# Patient Record
Sex: MALE | Race: WHITE | Employment: OTHER | URBAN - METROPOLITAN AREA
[De-identification: names, ages, dates, MRNs, and addresses within clinical notes are randomized per-mention and may not be internally consistent; named-entity substitution may affect disease eponyms.]

---

## 2021-04-11 ENCOUNTER — HOSPITAL ENCOUNTER (INPATIENT)
Age: 51
LOS: 5 days | Discharge: HOME | DRG: 330 | End: 2021-04-17
Attending: EMERGENCY MEDICINE | Admitting: FAMILY MEDICINE
Payer: OTHER GOVERNMENT

## 2021-04-11 DIAGNOSIS — R10.9 INTRACTABLE ABDOMINAL PAIN: Primary | ICD-10-CM

## 2021-04-11 DIAGNOSIS — R11.10 VOMITING, INTRACTABILITY OF VOMITING NOT SPECIFIED, PRESENCE OF NAUSEA NOT SPECIFIED, UNSPECIFIED VOMITING TYPE: ICD-10-CM

## 2021-04-11 DIAGNOSIS — Z90.49 STATUS POST COLECTOMY: ICD-10-CM

## 2021-04-11 DIAGNOSIS — K63.89 COLONIC MASS: ICD-10-CM

## 2021-04-11 LAB
ALBUMIN SERPL-MCNC: 3.9 G/DL (ref 3.4–5)
ALBUMIN/GLOB SERPL: 1.2 {RATIO} (ref 0.8–1.7)
ALP SERPL-CCNC: 55 U/L (ref 45–117)
ALT SERPL-CCNC: 62 U/L (ref 16–61)
ANION GAP SERPL CALC-SCNC: 7 MMOL/L (ref 3–18)
APPEARANCE UR: ABNORMAL
AST SERPL-CCNC: 41 U/L (ref 10–38)
BASOPHILS # BLD: 0.1 K/UL (ref 0–0.1)
BASOPHILS NFR BLD: 1 % (ref 0–2)
BILIRUB SERPL-MCNC: 0.3 MG/DL (ref 0.2–1)
BILIRUB UR QL: NEGATIVE
BUN SERPL-MCNC: 6 MG/DL (ref 7–18)
BUN/CREAT SERPL: 8 (ref 12–20)
CALCIUM SERPL-MCNC: 8.7 MG/DL (ref 8.5–10.1)
CHLORIDE SERPL-SCNC: 105 MMOL/L (ref 100–111)
CO2 SERPL-SCNC: 27 MMOL/L (ref 21–32)
COLOR UR: YELLOW
CREAT SERPL-MCNC: 0.76 MG/DL (ref 0.6–1.3)
DIFFERENTIAL METHOD BLD: ABNORMAL
EOSINOPHIL # BLD: 0.1 K/UL (ref 0–0.4)
EOSINOPHIL NFR BLD: 1 % (ref 0–5)
ERYTHROCYTE [DISTWIDTH] IN BLOOD BY AUTOMATED COUNT: 11.9 % (ref 11.6–14.5)
GLOBULIN SER CALC-MCNC: 3.2 G/DL (ref 2–4)
GLUCOSE SERPL-MCNC: 116 MG/DL (ref 74–99)
GLUCOSE UR STRIP.AUTO-MCNC: NEGATIVE MG/DL
HCT VFR BLD AUTO: 42.5 % (ref 36–48)
HGB BLD-MCNC: 14.3 G/DL (ref 13–16)
HGB UR QL STRIP: NEGATIVE
KETONES UR QL STRIP.AUTO: ABNORMAL MG/DL
LEUKOCYTE ESTERASE UR QL STRIP.AUTO: NEGATIVE
LIPASE SERPL-CCNC: 119 U/L (ref 73–393)
LYMPHOCYTES # BLD: 1.4 K/UL (ref 0.9–3.6)
LYMPHOCYTES NFR BLD: 28 % (ref 21–52)
MCH RBC QN AUTO: 30.6 PG (ref 24–34)
MCHC RBC AUTO-ENTMCNC: 33.6 G/DL (ref 31–37)
MCV RBC AUTO: 91 FL (ref 74–97)
MONOCYTES # BLD: 0.4 K/UL (ref 0.05–1.2)
MONOCYTES NFR BLD: 8 % (ref 3–10)
NEUTS SEG # BLD: 3 K/UL (ref 1.8–8)
NEUTS SEG NFR BLD: 61 % (ref 40–73)
NITRITE UR QL STRIP.AUTO: NEGATIVE
PH UR STRIP: 8 [PH] (ref 5–8)
PLATELET # BLD AUTO: 227 K/UL (ref 135–420)
PMV BLD AUTO: 8.3 FL (ref 9.2–11.8)
POTASSIUM SERPL-SCNC: 3.4 MMOL/L (ref 3.5–5.5)
PROT SERPL-MCNC: 7.1 G/DL (ref 6.4–8.2)
PROT UR STRIP-MCNC: NEGATIVE MG/DL
RBC # BLD AUTO: 4.67 M/UL (ref 4.35–5.65)
SODIUM SERPL-SCNC: 139 MMOL/L (ref 136–145)
SP GR UR REFRACTOMETRY: 1.01 (ref 1–1.03)
UROBILINOGEN UR QL STRIP.AUTO: 0.2 EU/DL (ref 0.2–1)
WBC # BLD AUTO: 4.9 K/UL (ref 4.6–13.2)

## 2021-04-11 PROCEDURE — 81003 URINALYSIS AUTO W/O SCOPE: CPT

## 2021-04-11 PROCEDURE — 80053 COMPREHEN METABOLIC PANEL: CPT

## 2021-04-11 PROCEDURE — 83690 ASSAY OF LIPASE: CPT

## 2021-04-11 PROCEDURE — 85025 COMPLETE CBC W/AUTO DIFF WBC: CPT

## 2021-04-11 PROCEDURE — 99285 EMERGENCY DEPT VISIT HI MDM: CPT

## 2021-04-11 PROCEDURE — 96374 THER/PROPH/DIAG INJ IV PUSH: CPT

## 2021-04-11 PROCEDURE — 74011250636 HC RX REV CODE- 250/636: Performed by: PHYSICIAN ASSISTANT

## 2021-04-11 PROCEDURE — 96375 TX/PRO/DX INJ NEW DRUG ADDON: CPT

## 2021-04-11 RX ORDER — ONDANSETRON 2 MG/ML
4 INJECTION INTRAMUSCULAR; INTRAVENOUS
Status: COMPLETED | OUTPATIENT
Start: 2021-04-11 | End: 2021-04-11

## 2021-04-11 RX ORDER — ONDANSETRON 2 MG/ML
4 INJECTION INTRAMUSCULAR; INTRAVENOUS
Status: DISCONTINUED | OUTPATIENT
Start: 2021-04-11 | End: 2021-04-11

## 2021-04-11 RX ORDER — MORPHINE SULFATE 4 MG/ML
4 INJECTION INTRAVENOUS
Status: COMPLETED | OUTPATIENT
Start: 2021-04-11 | End: 2021-04-11

## 2021-04-11 RX ADMIN — SODIUM CHLORIDE 1000 ML: 900 INJECTION, SOLUTION INTRAVENOUS at 23:33

## 2021-04-11 RX ADMIN — MORPHINE SULFATE 4 MG: 4 INJECTION INTRAVENOUS at 23:33

## 2021-04-11 RX ADMIN — ONDANSETRON 4 MG: 2 INJECTION INTRAMUSCULAR; INTRAVENOUS at 23:14

## 2021-04-12 ENCOUNTER — APPOINTMENT (OUTPATIENT)
Dept: CT IMAGING | Age: 51
DRG: 330 | End: 2021-04-12
Attending: PHYSICIAN ASSISTANT
Payer: OTHER GOVERNMENT

## 2021-04-12 PROBLEM — Z78.9 ALCOHOL USE: Status: ACTIVE | Noted: 2021-04-12

## 2021-04-12 PROBLEM — Z72.0 TOBACCO USE: Status: ACTIVE | Noted: 2021-04-12

## 2021-04-12 PROBLEM — R10.9 RIGHT SIDED ABDOMINAL PAIN: Status: ACTIVE | Noted: 2021-04-12

## 2021-04-12 PROBLEM — K63.89 COLONIC MASS: Status: ACTIVE | Noted: 2021-04-12

## 2021-04-12 PROBLEM — R10.9 INTRACTABLE ABDOMINAL PAIN: Status: ACTIVE | Noted: 2021-04-12

## 2021-04-12 PROCEDURE — 88305 TISSUE EXAM BY PATHOLOGIST: CPT

## 2021-04-12 PROCEDURE — 96376 TX/PRO/DX INJ SAME DRUG ADON: CPT

## 2021-04-12 PROCEDURE — 74011250636 HC RX REV CODE- 250/636: Performed by: FAMILY MEDICINE

## 2021-04-12 PROCEDURE — 2709999900 HC NON-CHARGEABLE SUPPLY: Performed by: INTERNAL MEDICINE

## 2021-04-12 PROCEDURE — 74011250637 HC RX REV CODE- 250/637: Performed by: FAMILY MEDICINE

## 2021-04-12 PROCEDURE — 74011250637 HC RX REV CODE- 250/637: Performed by: HOSPITALIST

## 2021-04-12 PROCEDURE — 0DBH8ZX EXCISION OF CECUM, VIA NATURAL OR ARTIFICIAL OPENING ENDOSCOPIC, DIAGNOSTIC: ICD-10-PCS | Performed by: INTERNAL MEDICINE

## 2021-04-12 PROCEDURE — 74177 CT ABD & PELVIS W/CONTRAST: CPT

## 2021-04-12 PROCEDURE — 77030040361 HC SLV COMPR DVT MDII -B: Performed by: INTERNAL MEDICINE

## 2021-04-12 PROCEDURE — 99153 MOD SED SAME PHYS/QHP EA: CPT | Performed by: INTERNAL MEDICINE

## 2021-04-12 PROCEDURE — 65270000029 HC RM PRIVATE

## 2021-04-12 PROCEDURE — G0500 MOD SEDAT ENDO SERVICE >5YRS: HCPCS | Performed by: INTERNAL MEDICINE

## 2021-04-12 PROCEDURE — 74011250636 HC RX REV CODE- 250/636: Performed by: INTERNAL MEDICINE

## 2021-04-12 PROCEDURE — 74011000250 HC RX REV CODE- 250: Performed by: FAMILY MEDICINE

## 2021-04-12 PROCEDURE — 77030039961 HC KT CUST COLON BSC -D: Performed by: INTERNAL MEDICINE

## 2021-04-12 PROCEDURE — 74011250636 HC RX REV CODE- 250/636: Performed by: EMERGENCY MEDICINE

## 2021-04-12 PROCEDURE — 76040000008: Performed by: INTERNAL MEDICINE

## 2021-04-12 PROCEDURE — 74011000636 HC RX REV CODE- 636: Performed by: EMERGENCY MEDICINE

## 2021-04-12 PROCEDURE — 74011000258 HC RX REV CODE- 258: Performed by: INTERNAL MEDICINE

## 2021-04-12 RX ORDER — MORPHINE SULFATE 4 MG/ML
4 INJECTION INTRAVENOUS
Status: DISCONTINUED | OUTPATIENT
Start: 2021-04-12 | End: 2021-04-16

## 2021-04-12 RX ORDER — FAMOTIDINE 20 MG/1
20 TABLET, FILM COATED ORAL DAILY
COMMUNITY

## 2021-04-12 RX ORDER — ACETAMINOPHEN 650 MG/1
650 SUPPOSITORY RECTAL
Status: DISCONTINUED | OUTPATIENT
Start: 2021-04-12 | End: 2021-04-16

## 2021-04-12 RX ORDER — ENOXAPARIN SODIUM 100 MG/ML
40 INJECTION SUBCUTANEOUS DAILY
Status: DISCONTINUED | OUTPATIENT
Start: 2021-04-12 | End: 2021-04-16

## 2021-04-12 RX ORDER — ACETAMINOPHEN 325 MG/1
650 TABLET ORAL
Status: DISCONTINUED | OUTPATIENT
Start: 2021-04-12 | End: 2021-04-16

## 2021-04-12 RX ORDER — POTASSIUM CHLORIDE 20 MEQ/1
40 TABLET, EXTENDED RELEASE ORAL
Status: COMPLETED | OUTPATIENT
Start: 2021-04-12 | End: 2021-04-12

## 2021-04-12 RX ORDER — POLYETHYLENE GLYCOL 3350 17 G/17G
17 POWDER, FOR SOLUTION ORAL DAILY PRN
Status: DISCONTINUED | OUTPATIENT
Start: 2021-04-12 | End: 2021-04-16

## 2021-04-12 RX ORDER — MIDAZOLAM HYDROCHLORIDE 1 MG/ML
INJECTION, SOLUTION INTRAMUSCULAR; INTRAVENOUS AS NEEDED
Status: DISCONTINUED | OUTPATIENT
Start: 2021-04-12 | End: 2021-04-13 | Stop reason: HOSPADM

## 2021-04-12 RX ORDER — PROMETHAZINE HYDROCHLORIDE 25 MG/1
12.5 TABLET ORAL
Status: DISCONTINUED | OUTPATIENT
Start: 2021-04-12 | End: 2021-04-17 | Stop reason: HOSPADM

## 2021-04-12 RX ORDER — POLYETHYLENE GLYCOL 3350, SODIUM SULFATE, SODIUM CHLORIDE, POTASSIUM CHLORIDE, SODIUM ASCORBATE, AND ASCORBIC ACID 7.5-2.691G
2000 KIT ORAL ONCE
Status: COMPLETED | OUTPATIENT
Start: 2021-04-12 | End: 2021-04-12

## 2021-04-12 RX ORDER — FENTANYL CITRATE 50 UG/ML
INJECTION, SOLUTION INTRAMUSCULAR; INTRAVENOUS AS NEEDED
Status: DISCONTINUED | OUTPATIENT
Start: 2021-04-12 | End: 2021-04-13 | Stop reason: HOSPADM

## 2021-04-12 RX ORDER — SODIUM CHLORIDE 9 MG/ML
INJECTION, SOLUTION INTRAVENOUS
Status: DISCONTINUED | OUTPATIENT
Start: 2021-04-12 | End: 2021-04-15 | Stop reason: HOSPADM

## 2021-04-12 RX ORDER — SODIUM CHLORIDE 0.9 % (FLUSH) 0.9 %
5-40 SYRINGE (ML) INJECTION AS NEEDED
Status: DISCONTINUED | OUTPATIENT
Start: 2021-04-12 | End: 2021-04-17 | Stop reason: SDUPTHER

## 2021-04-12 RX ORDER — SODIUM CHLORIDE 0.9 % (FLUSH) 0.9 %
5-40 SYRINGE (ML) INJECTION EVERY 8 HOURS
Status: DISCONTINUED | OUTPATIENT
Start: 2021-04-12 | End: 2021-04-17 | Stop reason: SDUPTHER

## 2021-04-12 RX ORDER — ONDANSETRON 2 MG/ML
4 INJECTION INTRAMUSCULAR; INTRAVENOUS
Status: DISCONTINUED | OUTPATIENT
Start: 2021-04-12 | End: 2021-04-17 | Stop reason: HOSPADM

## 2021-04-12 RX ORDER — ONDANSETRON 2 MG/ML
4 INJECTION INTRAMUSCULAR; INTRAVENOUS
Status: COMPLETED | OUTPATIENT
Start: 2021-04-12 | End: 2021-04-12

## 2021-04-12 RX ORDER — MORPHINE SULFATE 4 MG/ML
4 INJECTION INTRAVENOUS
Status: COMPLETED | OUTPATIENT
Start: 2021-04-12 | End: 2021-04-12

## 2021-04-12 RX ADMIN — PEG-3350, SODIUM SULFATE, SODIUM CHLORIDE, POTASSIUM CHLORIDE, SODIUM ASCORBATE AND ASCORBIC ACID 2000 ML: KIT at 06:30

## 2021-04-12 RX ADMIN — ONDANSETRON 4 MG: 2 INJECTION INTRAMUSCULAR; INTRAVENOUS at 03:27

## 2021-04-12 RX ADMIN — POTASSIUM CHLORIDE 40 MEQ: 1500 TABLET, EXTENDED RELEASE ORAL at 12:51

## 2021-04-12 RX ADMIN — IOPAMIDOL 100 ML: 612 INJECTION, SOLUTION INTRAVENOUS at 01:36

## 2021-04-12 RX ADMIN — FAMOTIDINE 20 MG: 10 INJECTION INTRAVENOUS at 09:01

## 2021-04-12 RX ADMIN — FAMOTIDINE 20 MG: 10 INJECTION INTRAVENOUS at 22:22

## 2021-04-12 RX ADMIN — MORPHINE SULFATE 4 MG: 4 INJECTION INTRAVENOUS at 22:51

## 2021-04-12 RX ADMIN — MORPHINE SULFATE 4 MG: 4 INJECTION INTRAVENOUS at 03:27

## 2021-04-12 RX ADMIN — Medication 10 ML: at 06:34

## 2021-04-12 RX ADMIN — PROMETHAZINE HYDROCHLORIDE 12.5 MG: 25 TABLET ORAL at 15:09

## 2021-04-12 RX ADMIN — MORPHINE SULFATE 4 MG: 4 INJECTION INTRAVENOUS at 15:02

## 2021-04-12 RX ADMIN — MORPHINE SULFATE 4 MG: 4 INJECTION INTRAVENOUS at 06:30

## 2021-04-12 RX ADMIN — MORPHINE SULFATE 4 MG: 4 INJECTION INTRAVENOUS at 19:23

## 2021-04-12 RX ADMIN — Medication 10 ML: at 22:00

## 2021-04-12 RX ADMIN — ONDANSETRON 4 MG: 2 INJECTION INTRAMUSCULAR; INTRAVENOUS at 19:23

## 2021-04-12 RX ADMIN — MORPHINE SULFATE 4 MG: 4 INJECTION INTRAVENOUS at 11:09

## 2021-04-12 RX ADMIN — ONDANSETRON 4 MG: 2 INJECTION INTRAMUSCULAR; INTRAVENOUS at 12:51

## 2021-04-12 NOTE — ED PROVIDER NOTES
EMERGENCY DEPARTMENT HISTORY AND PHYSICAL EXAM    Date: 4/11/2021  Patient Name: Eulalia Chen    History of Presenting Illness     Chief Complaint   Patient presents with    Abdominal Pain         History Provided By: Patient    Chief Complaint: abd pain       Additional History (Context):   10:42 PM  Eulalia Chen is a 48 y.o. male with PMHX HTN presents to the emergency department C/O abdominal pain that started tonight states it comes and goes very couple minutes on the left. He had vomiting once he arrived in the ED. No diarrhea or constipation. No fevers. Denies any history of abdominal surgeries. PCP: None        Past History     Past Medical History:  Past Medical History:   Diagnosis Date    GERD (gastroesophageal reflux disease)     Hypertension        Past Surgical History:  Past Surgical History:   Procedure Laterality Date    COLONOSCOPY N/A 4/12/2021    COLONOSCOPY; BIOPSY performed by Junie Sadler MD at [facility] ENDOSCOPY    HX TONSILLECTOMY         Family History:  Family History   Problem Relation Age of Onset    Cancer Mother     Cancer Father     Cancer Maternal Uncle        Social History:  Social History     Tobacco Use    Smoking status: Never Smoker    Smokeless tobacco: Current User   Substance Use Topics    Alcohol use: Yes     Alcohol/week: 7.0 - 8.0 standard drinks     Types: 7 - 8 Cans of beer per week    Drug use: Never       Allergies:  No Known Allergies    Review of Systems   Review of Systems   Constitutional: Negative for chills and fever. Respiratory: Negative for shortness of breath. Cardiovascular: Negative for chest pain. Gastrointestinal: Positive for abdominal pain, nausea and vomiting. Negative for constipation and diarrhea. Genitourinary: Negative for decreased urine volume, difficulty urinating, dysuria, enuresis, flank pain, frequency, hematuria and urgency. Musculoskeletal: Negative for back pain.    Neurological: Negative for weakness and numbness. All other systems reviewed and are negative. Physical Exam     Vitals:    04/12/21 2228 04/13/21 0253 04/13/21 0754 04/13/21 1135   BP:  (!) 154/93 (!) 155/100 (!) 135/92   Pulse:  93 80 79   Resp:  16 16 16   Temp: 98.7 °F (37.1 °C) 99.3 °F (37.4 °C) 98.8 °F (37.1 °C) 98.2 °F (36.8 °C)   SpO2:  95% 96% 99%   Weight:       Height:         Physical Exam  Vitals signs and nursing note reviewed. Constitutional:       Appearance: He is well-developed. Comments: Alert, lying on stretcher, appears very uncomfortable   HENT:      Head: Normocephalic and atraumatic. Neck:      Musculoskeletal: Normal range of motion and neck supple. Cardiovascular:      Rate and Rhythm: Normal rate and regular rhythm. Heart sounds: Normal heart sounds. No murmur. Pulmonary:      Effort: Pulmonary effort is normal. No respiratory distress. Breath sounds: Normal breath sounds. No wheezing or rales. Abdominal:      General: Bowel sounds are normal.      Palpations: Abdomen is soft. Tenderness: There is abdominal tenderness in the right lower quadrant. There is guarding and rebound. There is no right CVA tenderness or left CVA tenderness. Neurological:      Mental Status: He is alert and oriented to person, place, and time.    Psychiatric:         Judgment: Judgment normal.         Diagnostic Study Results     Labs:     Recent Results (from the past 12 hour(s))   METABOLIC PANEL, COMPREHENSIVE    Collection Time: 04/13/21  3:58 AM   Result Value Ref Range    Sodium 138 136 - 145 mmol/L    Potassium 3.9 3.5 - 5.5 mmol/L    Chloride 101 100 - 111 mmol/L    CO2 29 21 - 32 mmol/L    Anion gap 8 3.0 - 18 mmol/L    Glucose 107 (H) 74 - 99 mg/dL    BUN 8 7.0 - 18 MG/DL    Creatinine 0.90 0.6 - 1.3 MG/DL    BUN/Creatinine ratio 9 (L) 12 - 20      GFR est AA >60 >60 ml/min/1.73m2    GFR est non-AA >60 >60 ml/min/1.73m2    Calcium 8.6 8.5 - 10.1 MG/DL    Bilirubin, total 0.6 0.2 - 1.0 MG/DL    ALT (SGPT) 45 16 - 61 U/L    AST (SGOT) 26 10 - 38 U/L    Alk. phosphatase 52 45 - 117 U/L    Protein, total 6.6 6.4 - 8.2 g/dL    Albumin 3.6 3.4 - 5.0 g/dL    Globulin 3.0 2.0 - 4.0 g/dL    A-G Ratio 1.2 0.8 - 1.7     HEPATITIS PANEL, ACUTE    Collection Time: 04/13/21  3:58 AM   Result Value Ref Range    Hepatitis A, IgM Negative NEG      __          Hepatitis B surface Ag <0.10 <1.00 Index    Hep B surface Ag Interp. Negative NEG      __          Hepatitis B core, IgM Negative NEG      __          Hepatitis C virus Ab 0.05 <0.80 Index    Hep C virus Ab Interp. Negative NEG      Hep C  virus Ab comment         IRON PROFILE    Collection Time: 04/13/21  3:58 AM   Result Value Ref Range    Iron 160 50 - 175 ug/dL    TIBC 435 250 - 450 ug/dL    Iron % saturation 37 20 - 50 %   CBC W/O DIFF    Collection Time: 04/13/21  3:58 AM   Result Value Ref Range    WBC 13.2 4.6 - 13.2 K/uL    RBC 4.39 4.35 - 5.65 M/uL    HGB 13.4 13.0 - 16.0 g/dL    HCT 41.4 36.0 - 48.0 %    MCV 94.3 74.0 - 97.0 FL    MCH 30.5 24.0 - 34.0 PG    MCHC 32.4 31.0 - 37.0 g/dL    RDW 12.1 11.6 - 14.5 %    PLATELET 944 578 - 426 K/uL    MPV 8.6 (L) 9.2 - 11.8 FL       Radiologic Studies:   CT ABD PELV W CONT   Final Result      Lobular circumferential thickening of the ascending colon/cecum. Suspect a   neoplastic process. Fatty infiltration of the liver. CT Results  (Last 48 hours)               04/12/21 0144  CT ABD PELV W CONT Final result    Impression:      Lobular circumferential thickening of the ascending colon/cecum. Suspect a   neoplastic process. Fatty infiltration of the liver. Narrative:  EXAM: CT of the abdomen and pelvis       INDICATION: Pain. COMPARISON: None. TECHNIQUE: Axial CT imaging of the abdomen and pelvis was performed with   intravenous contrast. Multiplanar reformats were generated.  Dose reduction   techniques used: automated exposure control, adjustment of the mAs and/or kVp   according to patient size, and iterative reconstruction techniques. Digital   imaging and communications in Medicine (DICOM) format image data are available   to nonaffiliated external healthcare facilities or entities on a secure, media   free, reciprocally searchable basis with patient authorization for at least 12   months after this study. _______________       FINDINGS:       LOWER CHEST: Unremarkable. LIVER, BILIARY: The liver is of diminished attenuation. No biliary dilation. Gallbladder is unremarkable. PANCREAS: Normal.       SPLEEN: Normal.       ADRENALS: Normal.       KIDNEYS: There are a few subcentimeter hypodensities within the left kidney to   small to characterize but likely small cysts. LYMPH NODES: No enlarged lymph nodes. GASTROINTESTINAL TRACT: No bowel dilation or wall thickening. There is lobular   circumferential thickening of the cecum/ascending colon. PELVIC ORGANS: Unremarkable. VASCULATURE: Unremarkable. BONES: No acute or aggressive osseous abnormalities identified. OTHER: None.       _______________               CXR Results  (Last 48 hours)    None          Medical Decision Making   I am the first provider for this patient. I reviewed the vital signs, available nursing notes, past medical history, past surgical history, family history and social history. Vital Signs: Reviewed the patient's vital signs. Pulse Oximetry Analysis: 100% on RA     Records Reviewed: Nursing Notes and Old Medical Records    Procedures:  Procedures    ED Course:   10:42 PM Initial assessment performed. The patients presenting problems have been discussed, and they are in agreement with the care plan formulated and outlined with them. I have encouraged them to ask questions as they arise throughout their visit. SIGN OUT:  1:32 AM   Patient's presentation, labs/imaging and plan of care was reviewed with Debra Cintron MD as part of sign out.   They will follow up on ct as part of the plan discussed with the patient. Jose Soria. Evelyn Shrestha MD's assistance in completion of this plan is greatly appreciated but it should be noted that I will be the provider of record for this patient. FACE-TO-FACE PROGRESS NOTE:  3:15 AM  The patient presents with ongoing abdominal pain with nausea and vomiting. He has no fevers or weight loss or night sweats or even blood per rectum however pain has been difficult to control. He did respond to antiemetics. Otherwise he is without complaint. My exam shows well-appearing nontoxic male who is continued pain distress. He has continued tenderness to the left upper left lower quadrant. Otherwise his exam is unremarkable including cardiovascular system no evidence of hypertension denies without severe pallor. Imp/plan: CT did reveal the lesion was suspicious for colon cancer. His pain is difficult to control. For this reason we consulted both GI and hospitalist who are in agreement of keeping the patient for both pain control and colonoscopy for definitive analysis. I have personally seen and examined the patient, reviewed the PRETTY's note and agree with findings and plan. Written by Homar Jorgensen MD.    CONSULT NOTE:   3:45 AM  Homar Jorgensen MD   spoke with Dr. Azul Mcbride: Gastroenterology  Discussed pt's hx, disposition, and available diagnostic and imaging results  over the telephone. Reviewed care plans. Consulting physician agrees with plans as outlined. We will begin bowel prep immediately a lot of patient have clear liquids and scope for definitive diagnosis of cancer versus other findings in his differential.      CONSULT NOTE:   3:55 AM  Homar Jorgensen MD   spoke with Dr. Josselyn Finney  Specialty: Hospitalist  Discussed pt's hx, disposition, and available diagnostic and imaging results  over the telephone. Reviewed care plans. Consulting physician agrees with plans as outlined.   Willing to admit patient medical floor with GI to consult for definitive diagnosis. Diagnosis and Disposition     Critical Care Time: 0 minutes    Core Measures:  For Hospitalized Patients:    1. Hospitalization Decision Time:  The decision to hospitalize the patient was made by Wilber Avila MD   at 3:55 AM on April 12, 2021    2. Aspirin: Aspirin was not given because the patient is a bleeding risk    3:55 AM patient is being admitted to the hospital by Dr. Olivia Grider. The results of their tests and reasons for their admission have been discussed with them and/or available family. They convey agreement and understanding for the need to be admitted and for their admission diagnosis. CONDITIONS ON ADMISSION:  Sepsis is not present at the time of admission. Deep Vein Thrombosis is not present at the time of admission. Thrombosis is not present at the time of admission. Urinary Tract Infection is not present at the time of admission. Pneumonia is not present at the time of admission. MRSA is not present at the time of admission. Wound infection is not present at the time of admission. Pressure Ulcer is not present at the time of admission. CLINICAL IMPRESSION:    1. Intractable abdominal pain    2. Colonic mass    3. Vomiting, intractability of vomiting not specified, presence of nausea not specified, unspecified vomiting type                 Please note that this dictation was completed with [employer], the computer voice recognition software. Quite often unanticipated grammatical, syntax, homophones, and other interpretive errors are inadvertently transcribed by the computer software. Please disregard these errors. Please excuse any errors that have escaped final proofreading.

## 2021-04-12 NOTE — PROGRESS NOTES
TRANSFER - OUT REPORT:    Verbal report given to Neftali Balbuena on Kristina Erps  being transferred to [facility] for routine progression of care       Report consisted of patients Situation, Background, Assessment and   Recommendations(SBAR). Information from the following report(s) ED Summary, MAR and Recent Results was reviewed with the receiving nurse. Lines:   Peripheral IV 04/11/21 Left Antecubital (Active)        Opportunity for questions and clarification was provided.       Patient transported with:   [employer]

## 2021-04-12 NOTE — PERIOP NOTES
Telephone report given to Moses Paredes RN. Pt received 100 mcg fentanyl, 5 mg of versed during procedure. Biopsy of mass in cecum performed and sent to lab. Pt will open eyes and answer questions when spoken to by hospital staff. Pt is able to move all four extremities voluntarily on command.  Pt to be transported upstairs by endoscopy staff members

## 2021-04-12 NOTE — ED TRIAGE NOTES
Pt presents with c/o left sided abdominal pain for a couple hours. Pt denies N/V/D. Pt is A&Ox4, resps E&U, NAD.

## 2021-04-12 NOTE — PROGRESS NOTES
3180 Bedside and verbal shift change report given to Ankush Kovacs RN (on coming nurse) by Tasha Macias RN (off going nurse). Report included the following information SBAR, Kardex, OR Summary, Intake/Output and MAR.    0910 started second bottle of bowel prep. 1255 Pt c/o of n/v Pt reported vomiting and output was about 2000ml. PRN zofran administered. Pt c/o 8 10 pain to LLQ. PRN pain medication administered. 1920 Bedside and verbal shift change report given by Ankush Kovacs RN (off going nurse) to Shamar Laurent RN(on coming nurse). Report included the following information SBAR, Kardex, OR Summary, Intake/Output and MAR.

## 2021-04-12 NOTE — H&P
History & Physical    Patient: Rhina Pozo MRN: [id number]  CSN: [id number]    YOB: 1970  Age: 48 y.o. Sex: male      DOA: 4/11/2021  Primary Care Provider:  None      Assessment/Plan     Patient Active Problem List   Diagnosis Code    Right sided abdominal pain R10.9    Colonic mass K63.89    Tobacco use Z72.0    Alcohol use Z67.80     51-year-old male with chewing tobacco use is admitted for mass in the ascending colon with intractable abdominal pain. Right-sided abdominal pain due to ascending colon masssuspicious for malignancy  Clear liquid diet  Bowel prep  Gastroenterology consult  Colonoscopy with biopsy for tissue diagnosis    Tobacco useuses chewing tobacco  History obtained from tobacco use    Alcohol use7 beers per week  Can consider CIWA scale if needed    Full code    Prophylaxis: Pepcid IV, Lovenox SQ    Estimated length of stay : 2-3 nights    Corinne Gilbert, MD  Nocturnist  ----------------------------------------------------------------------------------------------------------------------------------------------------------------------------  CC: Abdominal pain       HPI:     Rhina Pozo is a 48 y.o. male who presents to the ED with his wife after acute onset of abdominal pain this evening. He has not had similar episodes prior to this. He does have significant for cancer in his family history with an uncle who has had colon cancer. He has never had a colonoscopy. He endorses a change in bowel habits with softer stool starting about 6 months ago. No weight loss, change in appetite, fever, or cough. History reviewed. No pertinent past medical history.     Past Surgical History:   Procedure Laterality Date    HX TONSILLECTOMY         Family History   Problem Relation Age of Onset    Cancer Mother     Cancer Father     Cancer Maternal Uncle        Social History     Socioeconomic History    Marital status:      Spouse name: Not on file    Number of children: Not on file    Years of education: Not on file    Highest education level: Not on file   Substance and Sexual Activity    Alcohol use: Yes     Alcohol/week: 7.0 - 8.0 standard drinks     Types: 7 - 8 Cans of beer per week    Drug use: Never   Other Topics Concern       Prior to Admission medications    Not on File       No Known Allergies    Review of Systems  Gen: No fever, chills, malaise, weight loss/gain. Heent: No headache, rhinorrhea, sore throat. Heart: No chest pain, palpitations, CAMACHO, pnd, or orthopnea. Resp: No cough, hemoptysis, wheezing and shortness of breath. GI: No nausea, +vomiting x1, loose stool, no constipation, melena or hematochezia. : No urinary obstruction, dysuria or hematuria. Derm: No rash, new skin lesion or pruritis. Musc/skeletal: no bone or joint complaints. Vasc: No edema, cyanosis or claudication. Endo: No heat/cold intolerance, no polyuria,polydipsia or polyphagia. Heme: No easy bruising or bleeding. Physical Exam:     Physical Exam:  Visit Vitals  BP (!) 174/97 (BP 1 Location: Left upper arm, BP Patient Position: At rest)   Pulse 88   Temp 98.1 °F (36.7 °C)   Resp 16   Ht 5' 10\" (1.778 m)   Wt 83.9 kg (185 lb)   SpO2 97%   BMI 26.54 kg/m²      O2 Device: None (Room air)    Temp (24hrs), Av.4 °F (36.9 °C), Min:98.1 °F (36.7 °C), Max:98.7 °F (37.1 °C)     1901 -  0700  In: 1000 [I.V.:1000]  Out: -    No intake/output data recorded. General:  Awake, cooperative, no distress. Head:  Normocephalic, without obvious abnormality, atraumatic. Eyes:  Conjunctivae/corneas clear, sclera anicteric. Neck: Supple, symmetrical, trachea midline. Lungs:   Clear to auscultation bilaterally. Heart:  Regular rate and rhythm, S1, S2 normal, no murmur, click, rub or gallop. Abdomen: Soft, tenderness to palpation in right lower quadrant, no rebound tenderness. Bowel sounds normal. No masses,  No organomegaly. Extremities: Extremities normal, atraumatic, no cyanosis or edema. Capillary refill normal.   Pulses: 2+ and symmetric all extremities. Skin: Skin color pink, turgor normal. No rashes or lesions   Neurologic: No focal motor or sensory deficit. Labs Reviewed: All lab results for the last 24 hours reviewed. Recent Results (from the past 24 hour(s))   CBC WITH AUTOMATED DIFF    Collection Time: 04/11/21 10:40 PM   Result Value Ref Range    WBC 4.9 4.6 - 13.2 K/uL    RBC 4.67 4.35 - 5.65 M/uL    HGB 14.3 13.0 - 16.0 g/dL    HCT 42.5 36.0 - 48.0 %    MCV 91.0 74.0 - 97.0 FL    MCH 30.6 24.0 - 34.0 PG    MCHC 33.6 31.0 - 37.0 g/dL    RDW 11.9 11.6 - 14.5 %    PLATELET 602 642 - 584 K/uL    MPV 8.3 (L) 9.2 - 11.8 FL    NEUTROPHILS 61 40 - 73 %    LYMPHOCYTES 28 21 - 52 %    MONOCYTES 8 3 - 10 %    EOSINOPHILS 1 0 - 5 %    BASOPHILS 1 0 - 2 %    ABS. NEUTROPHILS 3.0 1.8 - 8.0 K/UL    ABS. LYMPHOCYTES 1.4 0.9 - 3.6 K/UL    ABS. MONOCYTES 0.4 0.05 - 1.2 K/UL    ABS. EOSINOPHILS 0.1 0.0 - 0.4 K/UL    ABS. BASOPHILS 0.1 0.0 - 0.1 K/UL    DF AUTOMATED     METABOLIC PANEL, COMPREHENSIVE    Collection Time: 04/11/21 10:40 PM   Result Value Ref Range    Sodium 139 136 - 145 mmol/L    Potassium 3.4 (L) 3.5 - 5.5 mmol/L    Chloride 105 100 - 111 mmol/L    CO2 27 21 - 32 mmol/L    Anion gap 7 3.0 - 18 mmol/L    Glucose 116 (H) 74 - 99 mg/dL    BUN 6 (L) 7.0 - 18 MG/DL    Creatinine 0.76 0.6 - 1.3 MG/DL    BUN/Creatinine ratio 8 (L) 12 - 20      GFR est AA >60 >60 ml/min/1.73m2    GFR est non-AA >60 >60 ml/min/1.73m2    Calcium 8.7 8.5 - 10.1 MG/DL    Bilirubin, total 0.3 0.2 - 1.0 MG/DL    ALT (SGPT) 62 (H) 16 - 61 U/L    AST (SGOT) 41 (H) 10 - 38 U/L    Alk.  phosphatase 55 45 - 117 U/L    Protein, total 7.1 6.4 - 8.2 g/dL    Albumin 3.9 3.4 - 5.0 g/dL    Globulin 3.2 2.0 - 4.0 g/dL    A-G Ratio 1.2 0.8 - 1.7     LIPASE    Collection Time: 04/11/21 10:40 PM   Result Value Ref Range    Lipase 119 73 - 393 U/L   URINALYSIS W/ RFLX MICROSCOPIC    Collection Time: 04/11/21 11:39 PM   Result Value Ref Range    Color YELLOW      Appearance TURBID      Specific gravity 1.013 1.005 - 1.030      pH (UA) 8.0 5.0 - 8.0      Protein Negative NEG mg/dL    Glucose Negative NEG mg/dL    Ketone TRACE (A) NEG mg/dL    Bilirubin Negative NEG      Blood Negative NEG      Urobilinogen 0.2 0.2 - 1.0 EU/dL    Nitrites Negative NEG      Leukocyte Esterase Negative NEG       Results  CT ABD PELV W CONT (Accession [id number]) (Order [id number])  Allergies     No Known Allergies   Exam Information    Status Exam Begun  Exam Ended    Final [99] 4/12/2021 01:35 4/12/2021  1:44 AM [id number]  1:44 AM   Result Information    Status: Final result (Exam End: 4/12/2021 01:44) Provider Status: Open   Study Result    EXAM: CT of the abdomen and pelvis     INDICATION: Pain.     COMPARISON: None.     TECHNIQUE: Axial CT imaging of the abdomen and pelvis was performed with  intravenous contrast. Multiplanar reformats were generated. Dose reduction  techniques used: automated exposure control, adjustment of the mAs and/or kVp  according to patient size, and iterative reconstruction techniques. Digital  imaging and communications in Medicine (DICOM) format image data are available  to nonaffiliated external healthcare facilities or entities on a secure, media  free, reciprocally searchable basis with patient authorization for at least 12  months after this study. _______________     FINDINGS:     LOWER CHEST: Unremarkable.     LIVER, BILIARY: The liver is of diminished attenuation. No biliary dilation. Gallbladder is unremarkable.     PANCREAS: Normal.     SPLEEN: Normal.     ADRENALS: Normal.     KIDNEYS: There are a few subcentimeter hypodensities within the left kidney to  small to characterize but likely small cysts.     LYMPH NODES: No enlarged lymph nodes.     GASTROINTESTINAL TRACT: No bowel dilation or wall thickening.  There is lobular  circumferential thickening of the cecum/ascending colon.      PELVIC ORGANS: Unremarkable.     VASCULATURE: Unremarkable.     BONES: No acute or aggressive osseous abnormalities identified.     OTHER: None.     _______________     IMPRESSION     Lobular circumferential thickening of the ascending colon/cecum. Suspect a  neoplastic process.     Fatty infiltration of the liver.

## 2021-04-12 NOTE — ACP (ADVANCE CARE PLANNING)
assisted patient in completing Advance Medical Directives. A copy was placed in the chart for scanning and extra copies were left for the patient.  completed visit with patient and offered Pastoral care. Chaplains will continue to follow and will provide pastoral care  as needed or requested.      Sister Abdulaziz Alicia, [address] [facility] [address]  [phone]

## 2021-04-12 NOTE — PROCEDURES
[facility]  Colonoscopy Procedure Report  _______________________________________________________  Patient: Fernanda Fernandez                                        Attending Physician: Delia Linares MD    Patient ID: [id number]                                    Referring Physician: None    Exam Date: 4/12/2021      Introduction: A  48 y.o. male patient, presents for inpatient Colonoscopy    Indications: New onset acute LLQ pain started yesterday morning. CT scan of the abdomen and pelvis:    Lobular circumferential thickening of the ascending colon/cecum. Suspect a neoplastic process. Fatty infiltration of the liver. CBC normal. Usually, he has one bm daily. No rectal bleeding. No weight loss. One uncle had colon cancer. Consent: The benefits, risks, and alternatives to the procedure were discussed and informed consent was obtained from the patient. Preparation: EKG, pulse, pulse oximetry and blood pressure were monitored throughout the procedure. ASA Classification: Class I- . The heart is an S1-S2 and regular heart rate and rhythm. Lungs are clear to auscultation and percussion. Abdomen is soft, nondistended, and nontender. Mental Status: awake, alert, and oriented to person, place, and time    Medications:  · Fentanyl 100 mcg IV before procedure. · Versed 5 mg IV throughout the procedure. Rectal Exam: Normal Rectal Exam. No Blood. Pathology Specimens:  1    Procedure: The colonoscope was passed with ease through the anus under direct visualization and advanced to the cecum. The scope was withdrawn and the mucosa was carefully examined. The quality of the preparation was poor. The views were fair. The patient's toleration of the procedure was excellent. Total time is 14 minutes and withdrawal time is 8 minutes. Findings:    Rectum:   Normal  Sigmoid: At least one 9 mm sessile polyp in the mid sigmoid colon at 22 cm not removed because of the poor bowel prep.   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 Biopsies taken. Cecum:   Large cancerous tumor. Terminal Ileum:   Not entered      Unplanned Events: There were no unplanned events. Estimated Blood Loss: None  Impressions: Poor bowel prep. There is at least one 9 mm sessile polyp in the mid sigmoid colon at 22 cm not removed because of the poor bowel prep. 13 mm semipedunculated polyp in the hepatic flexure, not removed because it would be removed with the right hemicolectomy. 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 Biopsies taken. Large cecal cancerous tumor. Normal Mucosa. No blood or AVM found. Complications: None; patient tolerated the procedure well. Recommendations:  · Return to the floor. Consult with Dr Radha Taylor. [facility]  · Resume full liquid diet. · Resume own medications. Avoid all NSAID's for  · Colonoscopy recommendation in  No longer than one year with adequate bowel prep to perform an adequate exam and remove all the polyps. · Take Miralax and/ or Colace 100 mg on regular basis  · Recommend genetic testing for HNPCC Mutation.   All first degree relative should undergo screening colonoscopy starting at age 36 or any age if symptoms    Procedure Codes:    · COLONOSCOPY,BIOPSY [[id number]]    Endoscope Information:  Model Number(s)    [id number]   Assistant: None  Signed By: Anayeli Grossman MD Date: 4/12/2021

## 2021-04-12 NOTE — PROGRESS NOTES
TRANSFER - IN REPORT:    Verbal report received from [facility], RN(name) on [facility]  being received from ED(unit) for routine progression of care      Report consisted of patients Situation, Background, Assessment and   Recommendations(SBAR). Information from the following report(s) SBAR, Kardex, ED Summary, Intake/Output, MAR and Recent Results was reviewed with the receiving nurse. Opportunity for questions and clarification was provided. Assessment completed upon patients arrival to unit and care assumed. 0455 Pt received in room, A/O x4, ambulated in hallway, voided in bathroom. 0600 Checked with MD and pharmacist regarding administration of PEG 3350. Informed by pharmacist to give 2nd dose at least 1 and 1/2 hrs after 1st dose - will pass on in report to incoming nurse. Bowel prep started, no scheduled posted for colonoscopy at this time. 0715 Bedside and Verbal shift change report given to Annika Barksdale RN (oncoming nurse) by Jose Victoria RN   (offgoing nurse). Report included the following information SBAR, Kardex, Intake/Output, MAR and Recent Results.

## 2021-04-12 NOTE — PROGRESS NOTES
Problem: Falls - Risk of  Goal: *Absence of Falls  Description: Document Tomas Maguire Fall Risk and appropriate interventions in the flowsheet.   Outcome: Progressing Towards Goal  Note: Fall Risk Interventions:            Medication Interventions: Evaluate medications/consider consulting pharmacy, Patient to call before getting OOB

## 2021-04-13 LAB
ALBUMIN SERPL-MCNC: 3.6 G/DL (ref 3.4–5)
ALBUMIN/GLOB SERPL: 1.2 {RATIO} (ref 0.8–1.7)
ALP SERPL-CCNC: 52 U/L (ref 45–117)
ALT SERPL-CCNC: 45 U/L (ref 16–61)
ANION GAP SERPL CALC-SCNC: 8 MMOL/L (ref 3–18)
AST SERPL-CCNC: 26 U/L (ref 10–38)
BILIRUB SERPL-MCNC: 0.6 MG/DL (ref 0.2–1)
BUN SERPL-MCNC: 8 MG/DL (ref 7–18)
BUN/CREAT SERPL: 9 (ref 12–20)
CALCIUM SERPL-MCNC: 8.6 MG/DL (ref 8.5–10.1)
CHLORIDE SERPL-SCNC: 101 MMOL/L (ref 100–111)
CO2 SERPL-SCNC: 29 MMOL/L (ref 21–32)
CREAT SERPL-MCNC: 0.9 MG/DL (ref 0.6–1.3)
ERYTHROCYTE [DISTWIDTH] IN BLOOD BY AUTOMATED COUNT: 12.1 % (ref 11.6–14.5)
GLOBULIN SER CALC-MCNC: 3 G/DL (ref 2–4)
GLUCOSE SERPL-MCNC: 107 MG/DL (ref 74–99)
HAV IGM SER QL: NEGATIVE
HBV CORE IGM SER QL: NEGATIVE
HBV SURFACE AG SER QL: <0.1 INDEX
HBV SURFACE AG SER QL: NEGATIVE
HCT VFR BLD AUTO: 41.4 % (ref 36–48)
HCV AB SER IA-ACNC: 0.05 INDEX
HCV AB SERPL QL IA: NEGATIVE
HCV COMMENT,HCGAC: NORMAL
HGB BLD-MCNC: 13.4 G/DL (ref 13–16)
IRON SATN MFR SERPL: 37 % (ref 20–50)
IRON SERPL-MCNC: 160 UG/DL (ref 50–175)
MCH RBC QN AUTO: 30.5 PG (ref 24–34)
MCHC RBC AUTO-ENTMCNC: 32.4 G/DL (ref 31–37)
MCV RBC AUTO: 94.3 FL (ref 74–97)
PLATELET # BLD AUTO: 250 K/UL (ref 135–420)
PMV BLD AUTO: 8.6 FL (ref 9.2–11.8)
POTASSIUM SERPL-SCNC: 3.9 MMOL/L (ref 3.5–5.5)
PROT SERPL-MCNC: 6.6 G/DL (ref 6.4–8.2)
RBC # BLD AUTO: 4.39 M/UL (ref 4.35–5.65)
SODIUM SERPL-SCNC: 138 MMOL/L (ref 136–145)
SP1: NORMAL
SP2: NORMAL
SP3: NORMAL
TIBC SERPL-MCNC: 435 UG/DL (ref 250–450)
WBC # BLD AUTO: 13.2 K/UL (ref 4.6–13.2)

## 2021-04-13 PROCEDURE — 74011250636 HC RX REV CODE- 250/636: Performed by: FAMILY MEDICINE

## 2021-04-13 PROCEDURE — 80053 COMPREHEN METABOLIC PANEL: CPT

## 2021-04-13 PROCEDURE — 80074 ACUTE HEPATITIS PANEL: CPT

## 2021-04-13 PROCEDURE — 36415 COLL VENOUS BLD VENIPUNCTURE: CPT

## 2021-04-13 PROCEDURE — 74011000250 HC RX REV CODE- 250: Performed by: FAMILY MEDICINE

## 2021-04-13 PROCEDURE — 83540 ASSAY OF IRON: CPT

## 2021-04-13 PROCEDURE — 85027 COMPLETE CBC AUTOMATED: CPT

## 2021-04-13 PROCEDURE — 65270000029 HC RM PRIVATE

## 2021-04-13 PROCEDURE — 82378 CARCINOEMBRYONIC ANTIGEN: CPT

## 2021-04-13 RX ADMIN — FAMOTIDINE 20 MG: 10 INJECTION INTRAVENOUS at 20:57

## 2021-04-13 RX ADMIN — ENOXAPARIN SODIUM 40 MG: 40 INJECTION SUBCUTANEOUS at 08:06

## 2021-04-13 RX ADMIN — MORPHINE SULFATE 4 MG: 4 INJECTION INTRAVENOUS at 22:34

## 2021-04-13 RX ADMIN — MORPHINE SULFATE 4 MG: 4 INJECTION INTRAVENOUS at 19:45

## 2021-04-13 RX ADMIN — Medication 10 ML: at 06:03

## 2021-04-13 RX ADMIN — MORPHINE SULFATE 4 MG: 4 INJECTION INTRAVENOUS at 07:57

## 2021-04-13 RX ADMIN — ONDANSETRON 4 MG: 2 INJECTION INTRAMUSCULAR; INTRAVENOUS at 16:07

## 2021-04-13 RX ADMIN — MORPHINE SULFATE 4 MG: 4 INJECTION INTRAVENOUS at 12:13

## 2021-04-13 RX ADMIN — Medication 10 ML: at 22:00

## 2021-04-13 RX ADMIN — ONDANSETRON 4 MG: 2 INJECTION INTRAMUSCULAR; INTRAVENOUS at 20:57

## 2021-04-13 RX ADMIN — FAMOTIDINE 20 MG: 10 INJECTION INTRAVENOUS at 08:05

## 2021-04-13 RX ADMIN — MORPHINE SULFATE 4 MG: 4 INJECTION INTRAVENOUS at 16:07

## 2021-04-13 NOTE — PROGRESS NOTES
Problem: Falls - Risk of  Goal: *Absence of Falls  Description: Document Ursula Jimenez Fall Risk and appropriate interventions in the flowsheet.   Outcome: Progressing Towards Goal  Note: Fall Risk Interventions:            Medication Interventions: Assess postural VS orthostatic hypotension                   Problem: Patient Education: Go to Patient Education Activity  Goal: Patient/Family Education  Outcome: Progressing Towards Goal

## 2021-04-13 NOTE — PROGRESS NOTES
Noted pt was found with left lower quadrant pain and was found to have a large cecal mass. Pt is currently pending  surgery and oncology consults. CM to await outcome of consults to further assist with identifying transition of care needs. CM to continue to follow and assist.    Care Management Interventions  Mode of Transport at Discharge:  Other (see comment)(family)  Transition of Care Consult (CM Consult): Discharge Planning  Health Maintenance Reviewed: Yes  Current Support Network: Lives with Spouse  Confirm Follow Up Transport: Self  The Plan for Transition of Care is Related to the Following Treatment Goals : Home with physician follow up   Discharge Location  Discharge Placement: Home with family assistance

## 2021-04-13 NOTE — PROGRESS NOTES
Hospitalist Progress Note    Patient: Natasha Mujica MRN: [id number]  CSN: [id number]    YOB: 1970  Age: 48 y.o. Sex: male    DOA: 4/11/2021 LOS:  LOS: 1 day                Assessment/Plan     Patient Active Problem List   Diagnosis Code    Right sided abdominal pain R10.9    Colonic mass K63.89    Tobacco use Z72.0    Alcohol use Z67.80            59-year-old male with chewing tobacco use is admitted for mass in the ascending colon with intractable abdominal pain.     Right-sided abdominal pain due to ascending colon mass  suspicious for malignancy  S/p colonoscopy  Awaiting pathology  Colorectal surgery consulted  Oncology consulted. HTN -  Not on any home meds.   Monitor. Pain control    Disposition : TBD    Review of systems  General: No fevers or chills. Cardiovascular: No chest pain or pressure. No palpitations. Pulmonary: No shortness of breath. Gastrointestinal: No nausea, vomiting. Physical Exam:  General: Awake, cooperative, no acute distress    HEENT: NC, Atraumatic. PERRLA, anicteric sclerae. Lungs: CTA Bilaterally. No Wheezing/Rhonchi/Rales. Heart:  S1 S2,  No murmur, No Rubs, No Gallops  Abdomen: Soft, Non distended, gen tender.  +Bowel sounds,   Extremities: No c/c/e  Psych:   Not anxious or agitated. Neurologic:  No acute neurological deficit. Vital signs/Intake and Output:  Visit Vitals  BP (!) 165/99 (BP 1 Location: Right upper arm, BP Patient Position: At rest)   Pulse 78   Temp 98.3 °F (36.8 °C)   Resp 18   Ht 5' 10\" (1.778 m)   Wt 83.9 kg (185 lb)   SpO2 95%   BMI 26.54 kg/m²     Current Shift:  No intake/output data recorded.   Last three shifts:  04/11 1901 - 04/13 0700  In: 1200 [I.V.:1200]  Out: -             Labs: Results:       Chemistry Recent Labs     04/13/21  0358 04/11/21  2240   * 116*    139   K 3.9 3.4*    105   CO2 29 27   BUN 8 6*   CREA 0.90 0.76   CA 8.6 8.7   AGAP 8 7   BUCR 9* 8*   AP 52 55   TP 6.6 7.1 ALB 3.6 3.9   GLOB 3.0 3.2   AGRAT 1.2 1.2      CBC w/Diff Recent Labs     04/13/21  0358 04/11/21  2240   WBC 13.2 4.9   RBC 4.39 4.67   HGB 13.4 14.3   HCT 41.4 42.5    227   GRANS  --  61   LYMPH  --  28   EOS  --  1      Cardiac Enzymes No results for input(s): CPK, CKND1, AZUCENA in the last 72 hours. No lab exists for component: CKRMB, TROIP   Coagulation No results for input(s): PTP, INR, APTT, INREXT in the last 72 hours. Lipid Panel No results found for: CHOL, CHOLPOCT, CHOLX, CHLST, CHOLV, 531685, HDL, HDLP, LDL, LDLC, DLDLP, 040969, VLDLC, VLDL, TGLX, TRIGL, TRIGP, TGLPOCT, CHHD, CHHDX   BNP No results for input(s): BNPP in the last 72 hours.    Liver Enzymes Recent Labs     04/13/21  0358   TP 6.6   ALB 3.6   AP 52      Thyroid Studies No results found for: T4, T3U, TSH, TSHEXT     Procedures/imaging: see electronic medical records for all procedures/Xrays and details which were not copied into this note but were reviewed prior to creation of Plan

## 2021-04-13 NOTE — H&P (VIEW-ONLY)
[id number] [facility] Name:  Sheree Galdamez 
MR#:   [id number] :  1970 ACCOUNT #:  [de-identified] DATE OF SERVICE:  2021 HISTORY OF PRESENT ILLNESS:  This is a 43-year-old male who came yesterday to the emergency room complaining of severe new-onset left lower quadrant pain that started yesterday evening at around 8 o'clock. CT scan of the abdomen and pelvis revealed the presence of a large suspicious circumferential mass in the cecum. The patient claims that usually he used to have regular bowel movement everyday. There was no diarrhea, rectal bleeding, or constipation. He had some nausea, but there was no vomiting. He also denies having any heartburns or dysphagia. His weight has been stable. PAST MEDICAL AND SURGICAL HISTORY:  Remarkable only for tonsillectomy. MEDICATIONS:  Medication at home was mostly famotidine that apparently he has been taking for long term because of chronic GERD, which is well controlled. ALLERGIES:  NO KNOWN DRUG ALLERGIES. SOCIAL HISTORY:  He used to drink moderately about 7 drinks per week. Smoked smokeless tobacco.  He is living with his wife. FAMILY HISTORY:  His mother had breast cancer, father has prostate cancer, and maternal uncle apparently has colon cancer. REVIEW OF SYSTEMS:  He denies having dysphagia. He denies having any diabetes mellitus or coronary artery disease. He denies having shortness of breath, chest pain, stroke, paralysis, numbness, loss of consciousness, or dizziness. PHYSICAL EXAMINATION: 
GENERAL:  We have a 43-year-old  male who appears to be in no distress. VITAL SIGNS:  He weighs 185, temperature is 98.1, pulse 75, blood pressure 184/103, breathing 18, and saturation 96%-98% on room air. SKIN:  Normal.  There is no evidence of any rash. HEENT:  The eyes are unremarkable. The pupils are equal and reactive to light. The sclerae are anicteric. The conjunctivae are pink. Oropharyngeal cavity is normal with moist and pink mucous membranes. Normal teeth. NECK:  Supple. No palpable mass, no enlarged thyroid. LUNGS:  Clear to auscultation. HEART:  The cardiac rhythm is regular. S1 and S2 normal.  No murmur. ABDOMEN:  Rounded, slightly distended, but with normal bowel sounds. There is no major tenderness. There is no surgical scar. EXTREMITIES:  Unremarkable. No pedal edema. No clubbing. NEUROLOGIC:  No tremor. No focal neurological signs. He is alert and oriented. Moves all four extremities. LABORATORY DATA:  His hemoglobin 14.3; normal MCV and MCH; and normal WBCs, platelet, and differential.  Urinalysis completely normal.  Complete metabolic panel is remarkable only for a potassium of 3.4. AST 41, ALT 62, and alkaline phosphatase, lipase, and albumin are normal. 
 
DIAGNOSTIC DATA:  CT scan of the abdomen and pelvis with contrast revealed the presence of lobular circumferential thickening of the ascending colon and cecum highly suspicious for cancer. There is fatty infiltration of the liver. In conclusion, this is a 51-year-old male who presents with a left lower quadrant pain and was found to have a large cecal mass. We are going to do today a colonoscopy to verify this. It is strange that the patient does not have any anemia. He does have, however, mild elevation of his liver enzymes which may be in part related to alcohol consumption. We would like to do a minor investigation, but I am going to repeat also his liver enzymes, but at surgery, he may require to have a liver biopsy. For now, I will do the colonoscopy, and after this, the patient needs to be seen by Surgery and also by Oncology. If he truly has cancer, then all his family members should undergo screening colonoscopy starting from age 36. The patient should be tested for HNPCC, which is hereditary nonpolyposis colorectal cancer gene.   It is better to abstain from smoking and drinking in this case. Today, his blood pressure was on the high side. This may have to be addressed later. Sincerely, MD KRISTINA Lovelace/[email or web address]/[email or web address]_P 
D:  04/12/2021 18:17 
T:  04/12/2021 20:54 
JOB #:  [id number] CC:  Zabrina Napoles MD

## 2021-04-13 NOTE — CONSULTS
[id number] [facility]    Name:  Alejandra Mcgee  MR#:   [id number]  :  1970  ACCOUNT #:  [de-identified]  DATE OF SERVICE:  2021      HISTORY OF PRESENT ILLNESS:  This is a 51-year-old male who came yesterday to the emergency room complaining of severe new-onset left lower quadrant pain that started yesterday evening at around 8 o'clock. CT scan of the abdomen and pelvis revealed the presence of a large suspicious circumferential mass in the cecum. The patient claims that usually he used to have regular bowel movement everyday. There was no diarrhea, rectal bleeding, or constipation. He had some nausea, but there was no vomiting. He also denies having any heartburns or dysphagia. His weight has been stable. PAST MEDICAL AND SURGICAL HISTORY:  Remarkable only for tonsillectomy. MEDICATIONS:  Medication at home was mostly famotidine that apparently he has been taking for long term because of chronic GERD, which is well controlled. ALLERGIES:  NO KNOWN DRUG ALLERGIES. SOCIAL HISTORY:  He used to drink moderately about 7 drinks per week. Smoked smokeless tobacco.  He is living with his wife. FAMILY HISTORY:  His mother had breast cancer, father has prostate cancer, and maternal uncle apparently has colon cancer. REVIEW OF SYSTEMS:  He denies having dysphagia. He denies having any diabetes mellitus or coronary artery disease. He denies having shortness of breath, chest pain, stroke, paralysis, numbness, loss of consciousness, or dizziness. PHYSICAL EXAMINATION:  GENERAL:  We have a 51-year-old  male who appears to be in no distress. VITAL SIGNS:  He weighs 185, temperature is 98.1, pulse 75, blood pressure 184/103, breathing 18, and saturation 96%-98% on room air. SKIN:  Normal.  There is no evidence of any rash. HEENT:  The eyes are unremarkable. The pupils are equal and reactive to light. The sclerae are anicteric. The conjunctivae are pink. Oropharyngeal cavity is normal with moist and pink mucous membranes. Normal teeth. NECK:  Supple. No palpable mass, no enlarged thyroid. LUNGS:  Clear to auscultation. HEART:  The cardiac rhythm is regular. S1 and S2 normal.  No murmur. ABDOMEN:  Rounded, slightly distended, but with normal bowel sounds. There is no major tenderness. There is no surgical scar. EXTREMITIES:  Unremarkable. No pedal edema. No clubbing. NEUROLOGIC:  No tremor. No focal neurological signs. He is alert and oriented. Moves all four extremities. LABORATORY DATA:  His hemoglobin 14.3; normal MCV and MCH; and normal WBCs, platelet, and differential.  Urinalysis completely normal.  Complete metabolic panel is remarkable only for a potassium of 3.4. AST 41, ALT 62, and alkaline phosphatase, lipase, and albumin are normal.    DIAGNOSTIC DATA:  CT scan of the abdomen and pelvis with contrast revealed the presence of lobular circumferential thickening of the ascending colon and cecum highly suspicious for cancer. There is fatty infiltration of the liver. In conclusion, this is a 14-year-old male who presents with a left lower quadrant pain and was found to have a large cecal mass. We are going to do today a colonoscopy to verify this. It is strange that the patient does not have any anemia. He does have, however, mild elevation of his liver enzymes which may be in part related to alcohol consumption. We would like to do a minor investigation, but I am going to repeat also his liver enzymes, but at surgery, he may require to have a liver biopsy. For now, I will do the colonoscopy, and after this, the patient needs to be seen by Surgery and also by Oncology. If he truly has cancer, then all his family members should undergo screening colonoscopy starting from age 36. The patient should be tested for HNPCC, which is hereditary nonpolyposis colorectal cancer gene.   It is better to abstain from smoking and drinking in this case. Today, his blood pressure was on the high side. This may have to be addressed later.     Sincerely,      MD KRISTINA Rachel/[email or web address]  D:  04/12/2021 18:17  T:  04/12/2021 20:54  JOB #:  [id number]  CC:  Andres Hercules MD

## 2021-04-14 LAB
ALBUMIN SERPL-MCNC: 3.3 G/DL (ref 3.4–5)
ALBUMIN/GLOB SERPL: 1.2 {RATIO} (ref 0.8–1.7)
ALP SERPL-CCNC: 45 U/L (ref 45–117)
ALT SERPL-CCNC: 34 U/L (ref 16–61)
ANION GAP SERPL CALC-SCNC: 6 MMOL/L (ref 3–18)
AST SERPL-CCNC: 16 U/L (ref 10–38)
BILIRUB SERPL-MCNC: 0.6 MG/DL (ref 0.2–1)
BUN SERPL-MCNC: 8 MG/DL (ref 7–18)
BUN/CREAT SERPL: 10 (ref 12–20)
CALCIUM SERPL-MCNC: 8.2 MG/DL (ref 8.5–10.1)
CEA SERPL-MCNC: 85.9 NG/ML
CHLORIDE SERPL-SCNC: 102 MMOL/L (ref 100–111)
CO2 SERPL-SCNC: 30 MMOL/L (ref 21–32)
CREAT SERPL-MCNC: 0.8 MG/DL (ref 0.6–1.3)
ERYTHROCYTE [DISTWIDTH] IN BLOOD BY AUTOMATED COUNT: 11.9 % (ref 11.6–14.5)
GLOBULIN SER CALC-MCNC: 2.8 G/DL (ref 2–4)
GLUCOSE SERPL-MCNC: 96 MG/DL (ref 74–99)
HCT VFR BLD AUTO: 40.6 % (ref 36–48)
HGB BLD-MCNC: 13.2 G/DL (ref 13–16)
MCH RBC QN AUTO: 30.6 PG (ref 24–34)
MCHC RBC AUTO-ENTMCNC: 32.5 G/DL (ref 31–37)
MCV RBC AUTO: 94.2 FL (ref 74–97)
PLATELET # BLD AUTO: 207 K/UL (ref 135–420)
PMV BLD AUTO: 8.8 FL (ref 9.2–11.8)
POTASSIUM SERPL-SCNC: 3.6 MMOL/L (ref 3.5–5.5)
PROT SERPL-MCNC: 6.1 G/DL (ref 6.4–8.2)
RBC # BLD AUTO: 4.31 M/UL (ref 4.35–5.65)
SODIUM SERPL-SCNC: 138 MMOL/L (ref 136–145)
WBC # BLD AUTO: 6.6 K/UL (ref 4.6–13.2)

## 2021-04-14 PROCEDURE — 74011250636 HC RX REV CODE- 250/636: Performed by: FAMILY MEDICINE

## 2021-04-14 PROCEDURE — 36415 COLL VENOUS BLD VENIPUNCTURE: CPT

## 2021-04-14 PROCEDURE — 80053 COMPREHEN METABOLIC PANEL: CPT

## 2021-04-14 PROCEDURE — 74011250636 HC RX REV CODE- 250/636: Performed by: HOSPITALIST

## 2021-04-14 PROCEDURE — 74011000250 HC RX REV CODE- 250: Performed by: FAMILY MEDICINE

## 2021-04-14 PROCEDURE — 65270000029 HC RM PRIVATE

## 2021-04-14 PROCEDURE — 85027 COMPLETE CBC AUTOMATED: CPT

## 2021-04-14 RX ORDER — SODIUM CHLORIDE 9 MG/ML
125 INJECTION, SOLUTION INTRAVENOUS CONTINUOUS
Status: DISCONTINUED | OUTPATIENT
Start: 2021-04-14 | End: 2021-04-16

## 2021-04-14 RX ADMIN — ONDANSETRON 4 MG: 2 INJECTION INTRAMUSCULAR; INTRAVENOUS at 08:04

## 2021-04-14 RX ADMIN — SODIUM CHLORIDE 100 ML/HR: 900 INJECTION, SOLUTION INTRAVENOUS at 21:37

## 2021-04-14 RX ADMIN — MORPHINE SULFATE 4 MG: 4 INJECTION INTRAVENOUS at 17:44

## 2021-04-14 RX ADMIN — ONDANSETRON 4 MG: 2 INJECTION INTRAMUSCULAR; INTRAVENOUS at 17:44

## 2021-04-14 RX ADMIN — MORPHINE SULFATE 4 MG: 4 INJECTION INTRAVENOUS at 21:37

## 2021-04-14 RX ADMIN — ENOXAPARIN SODIUM 40 MG: 40 INJECTION SUBCUTANEOUS at 09:00

## 2021-04-14 RX ADMIN — FAMOTIDINE 20 MG: 10 INJECTION INTRAVENOUS at 21:37

## 2021-04-14 RX ADMIN — MORPHINE SULFATE 4 MG: 4 INJECTION INTRAVENOUS at 08:04

## 2021-04-14 RX ADMIN — MORPHINE SULFATE 4 MG: 4 INJECTION INTRAVENOUS at 03:41

## 2021-04-14 RX ADMIN — FAMOTIDINE 20 MG: 10 INJECTION INTRAVENOUS at 09:00

## 2021-04-14 RX ADMIN — SODIUM CHLORIDE 100 ML/HR: 900 INJECTION, SOLUTION INTRAVENOUS at 13:29

## 2021-04-14 RX ADMIN — MORPHINE SULFATE 4 MG: 4 INJECTION INTRAVENOUS at 13:26

## 2021-04-14 NOTE — PROGRESS NOTES
Problem: Falls - Risk of  Goal: *Absence of Falls  Description: Document Danika Carlson Fall Risk and appropriate interventions in the flowsheet.   Outcome: Progressing Towards Goal  Note: Fall Risk Interventions:            Medication Interventions: Patient to call before getting OOB         History of Falls Interventions: Door open when patient unattended

## 2021-04-14 NOTE — PROGRESS NOTES
Patient seen in evaluated. 30 minutes spent in discussion of diagnosis, treatment plan algorithm and reasoning, surgical risks benefits and alternatives outcomes and expectations. All questions of the patient his daughter and his wife were answered. Looking for OR time. Patient with obstructing right colon cancer. Although genetic testing is indicated and important, patients clinical status does not allow for the extended time r equired to get results back. Urgent surgical resection of the right colon will be performed. Looking for operative time on Friday. More information and full consult to follow. Pt needs CT chest for metastatic work up.      Scott Guerrero  Colorectal Surgery

## 2021-04-14 NOTE — PROGRESS NOTES
Hospitalist Progress Note    Patient: Shira Trinh MRN: [id number]  CSN: [id number]    YOB: 1970  Age: 48 y.o. Sex: male    DOA: 4/11/2021 LOS:  LOS: 2 days                Assessment/Plan     Patient Active Problem List   Diagnosis Code    Right sided abdominal pain R10.9    Colonic mass K63.89    Tobacco use Z72.0    Alcohol use Z67.80            44-year-old male with chewing tobacco use is admitted for mass in the ascending colon with intractable abdominal pain.     Right-sided abdominal pain due to ascending colon mass  suspicious for malignancy  S/p colonoscopy  Awaiting pathology  Colorectal surgery consulted. Plan for surgery on Friday  Oncology consulted. HTN -  Not on any home meds.   Monitor. Pain control    Disposition : TBD    Review of systems  General: No fevers or chills. Cardiovascular: No chest pain or pressure. No palpitations. Pulmonary: No shortness of breath. Gastrointestinal: No nausea, vomiting. Physical Exam:  General: Awake, cooperative, no acute distress    HEENT: NC, Atraumatic. PERRLA, anicteric sclerae. Lungs: CTA Bilaterally. No Wheezing/Rhonchi/Rales. Heart:  S1 S2,  No murmur, No Rubs, No Gallops  Abdomen: Soft, Non distended, gen tender.  +Bowel sounds,   Extremities: No c/c/e  Psych:   Not anxious or agitated. Neurologic:  No acute neurological deficit. Vital signs/Intake and Output:  Visit Vitals  /81 (BP 1 Location: Right arm, BP Patient Position: At rest)   Pulse 77   Temp 98.1 °F (36.7 °C)   Resp 18   Ht 5' 10\" (1.778 m)   Wt 85.9 kg (189 lb 4.8 oz)   SpO2 99%   BMI 27.16 kg/m²     Current Shift:  No intake/output data recorded. Last three shifts:  No intake/output data recorded.             Labs: Results:       Chemistry Recent Labs     04/14/21  0330 04/13/21  0358 04/11/21  2240   GLU 96 107* 116*    138 139   K 3.6 3.9 3.4*    101 105   CO2 30 29 27   BUN 8 8 6*   CREA 0.80 0.90 0.76   CA 8.2* 8.6 8.7 AGAP 6 8 7   BUCR 10* 9* 8*   AP 45 52 55   TP 6.1* 6.6 7.1   ALB 3.3* 3.6 3.9   GLOB 2.8 3.0 3.2   AGRAT 1.2 1.2 1.2      CBC w/Diff Recent Labs     04/14/21  0330 04/13/21  0358 04/11/21  2240   WBC 6.6 13.2 4.9   RBC 4.31* 4.39 4.67   HGB 13.2 13.4 14.3   HCT 40.6 41.4 42.5    250 227   GRANS  --   --  61   LYMPH  --   --  28   EOS  --   --  1      Cardiac Enzymes No results for input(s): CPK, CKND1, AZUCENA in the last 72 hours. No lab exists for component: CKRMB, TROIP   Coagulation No results for input(s): PTP, INR, APTT, INREXT, INREXT in the last 72 hours. Lipid Panel No results found for: CHOL, CHOLPOCT, CHOLX, CHLST, CHOLV, 722423, HDL, HDLP, LDL, LDLC, DLDLP, 979635, VLDLC, VLDL, TGLX, TRIGL, TRIGP, TGLPOCT, CHHD, CHHDX   BNP No results for input(s): BNPP in the last 72 hours.    Liver Enzymes Recent Labs     04/14/21  0330   TP 6.1*   ALB 3.3*   AP 45      Thyroid Studies No results found for: T4, T3U, TSH, TSHEXT, TSHEXT     Procedures/imaging: see electronic medical records for all procedures/Xrays and details which were not copied into this note but were reviewed prior to creation of Plan

## 2021-04-14 NOTE — PROGRESS NOTES
1074 Bedside shift report received from off going nurse Dejuan Rodriguez RN. Report included the following information SBAR, Kardex, Intake/Output, MAR and Recent Results. Pt family at bedside all day. 1935 Gave bedside shift report to oncoming nurse Dejuan Rodriguez RN. Report included the following information: SBAR, Kardex, Intake/Output, MAR and Recent Results.

## 2021-04-14 NOTE — PROGRESS NOTES
Problem: Falls - Risk of  Goal: *Absence of Falls  Description: Document Gordonashley Liyah Fall Risk and appropriate interventions in the flowsheet.   Outcome: Progressing Towards Goal  Note: Fall Risk Interventions:            Medication Interventions: Evaluate medications/consider consulting pharmacy         History of Falls Interventions: Door open when patient unattended

## 2021-04-14 NOTE — PROGRESS NOTES
0800 Bedside and verbal shift change report given to Becca Fried RN (on coming nurse) by Lawanda Pearce RN (off going nurse). Report included the following information SBAR, Kardex, OR Summary, Intake/Output and MAR. Shift summary: no new event. No s/s of distress noted. 1925 Bedside and verbal shift change report given by Becca Fried RN (off going nurse) to Lawanda Pearce RN(on coming nurse). Report included the following information SBAR, Kardex, OR Summary, Intake/Output and MAR.

## 2021-04-14 NOTE — PROGRESS NOTES
80- assessed patient     [id number]- patient had a watery BM wife changed bed and patient put on brief    Patient rested all evening     Bedside shift change report given to [address] [place] [address] (oncoming nurse) by HANH Feliz RN (offgoing nurse). Report included the following information SBAR.

## 2021-04-14 NOTE — CONSULTS
Phone: [phone]  Paging : [phone]      Hematology / Oncology Initial Consult Note    Admit Date: 4/11/2021    Reason for Consult: Suspected Colon Cancer    Requesting Physician: Dr. Luke Cain:     Eddie Hill is a 48 y.o., WHITE, male, who I have been asked to see for a large cecal mass suspicious for colon cancer. Principal Problem:    Right sided abdominal pain (4/12/2021)    Active Problems:    Colonic mass (4/12/2021)      Tobacco use (4/12/2021)      Alcohol use (4/12/2021)      Large Cecal mass suspicious for colon cancer: colonoscopy on 4/12/21 that revealed a 9mm sessile polyp, a 13mm semipedunculated polyp, and a very large fungating/friable mass in the ileo cecal valve occupying most of the cecum. Biopsies were taken and pathology pending. Hep B/C neg. CEA 85.9. Plan for urgent surgical resection this week. Plan:     - Agree with CT Chest with contrast to complete staging  - Follow up pathology   - Will send tumor sample for MSI testing and molecular studies    Will continue to follow. Please call with questions. Perez Suresh MD  [facility]  Cell [phone]    History of Present Illness: Eddie Hill is a 48 y.o. male with no significant past medical history who presented to the hospital on 4/11/21 with significant LLQ abdominal pain. He noted that it started the evening before and was associated with nausea, but no vomiting. CT A/P showed lobular circumferential thickening of the ascending colon and cecum suspicious for cancer. He underwent a colonoscopy on 4/12/21 that revealed a 9mm sessile polyp, a 13mm semipedunculated polyp, and a very large fungating/friable mass in the ileo cecal valve occupying most of the cecum. Biopsies were taken and pathology pending. He was evaluated by CRS with plan to take to the OR possibly Friday due to the obstructing right colon. Today, he reports doing well.  He notes being very healthy prior to admission and denied noting any constipation, diarrhea, thin stools, abdominal pain, nausea, vomiting, hematochezia, melena, or weight loss. Denies a family history of cancer. Currently on a thin liquid diet. Ambulating. ROS:    Constitutional: No fever, chills, diaphoresis, activity change, appetite change, fatigue or unexpected weight change. HEENT: No sore throat, rhinorrhea,or visual disturbance. Respiratory: No cough, chest tightness, shortness of breath. Cardiovascular:No chest pain, palpitations. Gastrointestinal:No nausea, vomiting, diarrhea, constipation. + Abdominal pain    Genitourinary: No dysuria, urgency, frequency, hematuria, flank pain, difficulty urinating. Neurological: No headache, dizziness, weakness, tingling and numbness or fall. Musculoskeletal: No bone pain or back pain. No arthralgia or myalgia. No bruise/bleed easily. No extremity swelling. Past Medical History:   Diagnosis Date    GERD (gastroesophageal reflux disease)     Hypertension        Past Surgical History:   Procedure Laterality Date    COLONOSCOPY N/A 4/12/2021    COLONOSCOPY; BIOPSY performed by Becki Mclaughlin MD at [facility] ENDOSCOPY    HX TONSILLECTOMY         Family History   Problem Relation Age of Onset    Cancer Mother     Cancer Father     Cancer Maternal Uncle        Social History     Socioeconomic History    Marital status:      Spouse name: Not on file    Number of children: Not on file    Years of education: Not on file    Highest education level: Not on file   Tobacco Use    Smoking status: Never Smoker    Smokeless tobacco: Current User   Substance and Sexual Activity    Alcohol use:  Yes     Alcohol/week: 7.0 - 8.0 standard drinks     Types: 7 - 8 Cans of beer per week    Drug use: Never    Sexual activity: Yes     Partners: Female   Other Topics Concern       Current Facility-Administered Medications   Medication Dose Route Frequency    famotidine (PF) (PEPCID) 20 mg in 0.9% sodium chloride 10 mL injection  20 mg IntraVENous Q12H    morphine injection 4 mg  4 mg IntraVENous Q3H PRN    sodium chloride (NS) flush 5-40 mL  5-40 mL IntraVENous Q8H    sodium chloride (NS) flush 5-40 mL  5-40 mL IntraVENous PRN    acetaminophen (TYLENOL) tablet 650 mg  650 mg Oral Q6H PRN    Or    acetaminophen (TYLENOL) suppository 650 mg  650 mg Rectal Q6H PRN    polyethylene glycol (MIRALAX) packet 17 g  17 g Oral DAILY PRN    promethazine (PHENERGAN) tablet 12.5 mg  12.5 mg Oral Q6H PRN    Or    ondansetron (ZOFRAN) injection 4 mg  4 mg IntraVENous Q6H PRN    enoxaparin (LOVENOX) injection 40 mg  40 mg SubCUTAneous DAILY    0.9% sodium chloride infusion    CONTINUOUS       Prior to Admission medications    Medication Sig Start Date End Date Taking? Authorizing Provider   famotidine (Pepcid AC) 20 mg tablet Take 20 mg by mouth daily.    Yes Provider, Historical       No Known Allergies    ECOG PS: 0    Physical Exam:    Temp (24hrs), Av.1 °F (36.7 °C), Min:97.7 °F (36.5 °C), Max:98.4 °F (36.9 °C)  VSIPNo intake or output data in the 24 hours ending 21 0953RESULTRCNT(24h)Ct Abd Pelv W Cont    General: Alert , Oriented, in no distress  HEENT: no pallor, anicteric sclera, oral pharynx without lesion   No cervical, supraclavicular, axillary and inguinal lymphadenopathy palpated  Heart: regular rate, and rhythm, without murmur, gallop or rubbing  Lungs:breathing comfortably on room air, clear to auscultation and percussion bilaterally  ABD: bowel sound present, soft, nondistended, some TTP in LLQ, no hepatosplenomegaly or mass  Extremities: warm, well perfused, no edema  MSK: no tenderness along the spine or long bones  Skin: No rash  Neuro: non-focal      Recent Results (from the past 24 hour(s))   METABOLIC PANEL, COMPREHENSIVE    Collection Time: 21  3:30 AM   Result Value Ref Range    Sodium 138 136 - 145 mmol/L    Potassium 3.6 3.5 - 5.5 mmol/L    Chloride 102 100 - 111 mmol/L    CO2 30 21 - 32 mmol/L    Anion gap 6 3.0 - 18 mmol/L    Glucose 96 74 - 99 mg/dL    BUN 8 7.0 - 18 MG/DL    Creatinine 0.80 0.6 - 1.3 MG/DL    BUN/Creatinine ratio 10 (L) 12 - 20      GFR est AA >60 >60 ml/min/1.73m2    GFR est non-AA >60 >60 ml/min/1.73m2    Calcium 8.2 (L) 8.5 - 10.1 MG/DL    Bilirubin, total 0.6 0.2 - 1.0 MG/DL    ALT (SGPT) 34 16 - 61 U/L    AST (SGOT) 16 10 - 38 U/L    Alk. phosphatase 45 45 - 117 U/L    Protein, total 6.1 (L) 6.4 - 8.2 g/dL    Albumin 3.3 (L) 3.4 - 5.0 g/dL    Globulin 2.8 2.0 - 4.0 g/dL    A-G Ratio 1.2 0.8 - 1.7     CBC W/O DIFF    Collection Time: 04/14/21  3:30 AM   Result Value Ref Range    WBC 6.6 4.6 - 13.2 K/uL    RBC 4.31 (L) 4.35 - 5.65 M/uL    HGB 13.2 13.0 - 16.0 g/dL    HCT 40.6 36.0 - 48.0 %    MCV 94.2 74.0 - 97.0 FL    MCH 30.6 24.0 - 34.0 PG    MCHC 32.5 31.0 - 37.0 g/dL    RDW 11.9 11.6 - 14.5 %    PLATELET 393 965 - 701 K/uL    MPV 8.8 (L) 9.2 - 11.8 FL     4/12/21 CT A/P  IMPRESSION     Lobular circumferential thickening of the ascending colon/cecum. Suspect a  neoplastic process.     Fatty infiltration of the liver.

## 2021-04-14 NOTE — PROGRESS NOTES
Noted plan for urgent surgical resection of the right colon this week. Please encourage ambulation as appropriate to assist with identifying potential transition of care needs. CM to continue to follow and assist.    Care Management Interventions  Mode of Transport at Discharge:  Other (see comment)(family)  Transition of Care Consult (CM Consult): Discharge Planning  Health Maintenance Reviewed: Yes  Current Support Network: Lives with Spouse  Confirm Follow Up Transport: Self  The Plan for Transition of Care is Related to the Following Treatment Goals : Home with physician follow up   Discharge Location  Discharge Placement: Home with family assistance

## 2021-04-15 ENCOUNTER — ANESTHESIA EVENT (OUTPATIENT)
Dept: SURGERY | Age: 51
DRG: 330 | End: 2021-04-15
Payer: OTHER GOVERNMENT

## 2021-04-15 LAB
ALBUMIN SERPL-MCNC: 3 G/DL (ref 3.4–5)
ALBUMIN/GLOB SERPL: 1.2 {RATIO} (ref 0.8–1.7)
ALP SERPL-CCNC: 39 U/L (ref 45–117)
ALT SERPL-CCNC: 26 U/L (ref 16–61)
ANION GAP SERPL CALC-SCNC: 2 MMOL/L (ref 3–18)
AST SERPL-CCNC: 16 U/L (ref 10–38)
BILIRUB SERPL-MCNC: 0.4 MG/DL (ref 0.2–1)
BUN SERPL-MCNC: 8 MG/DL (ref 7–18)
BUN/CREAT SERPL: 10 (ref 12–20)
CALCIUM SERPL-MCNC: 8 MG/DL (ref 8.5–10.1)
CHLORIDE SERPL-SCNC: 105 MMOL/L (ref 100–111)
CO2 SERPL-SCNC: 33 MMOL/L (ref 21–32)
CREAT SERPL-MCNC: 0.82 MG/DL (ref 0.6–1.3)
ERYTHROCYTE [DISTWIDTH] IN BLOOD BY AUTOMATED COUNT: 11.9 % (ref 11.6–14.5)
GLOBULIN SER CALC-MCNC: 2.5 G/DL (ref 2–4)
GLUCOSE SERPL-MCNC: 90 MG/DL (ref 74–99)
HCT VFR BLD AUTO: 34.9 % (ref 36–48)
HGB BLD-MCNC: 11.2 G/DL (ref 13–16)
MCH RBC QN AUTO: 30.4 PG (ref 24–34)
MCHC RBC AUTO-ENTMCNC: 32.1 G/DL (ref 31–37)
MCV RBC AUTO: 94.8 FL (ref 74–97)
PLATELET # BLD AUTO: 179 K/UL (ref 135–420)
PMV BLD AUTO: 8.9 FL (ref 9.2–11.8)
POTASSIUM SERPL-SCNC: 3.5 MMOL/L (ref 3.5–5.5)
PROT SERPL-MCNC: 5.5 G/DL (ref 6.4–8.2)
RBC # BLD AUTO: 3.68 M/UL (ref 4.35–5.65)
SODIUM SERPL-SCNC: 140 MMOL/L (ref 136–145)
WBC # BLD AUTO: 4.7 K/UL (ref 4.6–13.2)

## 2021-04-15 PROCEDURE — 80053 COMPREHEN METABOLIC PANEL: CPT

## 2021-04-15 PROCEDURE — 74011250636 HC RX REV CODE- 250/636: Performed by: HOSPITALIST

## 2021-04-15 PROCEDURE — 74011000250 HC RX REV CODE- 250: Performed by: FAMILY MEDICINE

## 2021-04-15 PROCEDURE — 74011250637 HC RX REV CODE- 250/637: Performed by: SURGERY

## 2021-04-15 PROCEDURE — 36415 COLL VENOUS BLD VENIPUNCTURE: CPT

## 2021-04-15 PROCEDURE — 85027 COMPLETE CBC AUTOMATED: CPT

## 2021-04-15 PROCEDURE — 65270000029 HC RM PRIVATE

## 2021-04-15 PROCEDURE — 74011250636 HC RX REV CODE- 250/636: Performed by: FAMILY MEDICINE

## 2021-04-15 RX ORDER — MAGNESIUM CITRATE
296 SOLUTION, ORAL ORAL
Status: COMPLETED | OUTPATIENT
Start: 2021-04-15 | End: 2021-04-15

## 2021-04-15 RX ORDER — NEOMYCIN SULFATE 500 MG/1
1000 TABLET ORAL 3 TIMES DAILY
Status: COMPLETED | OUTPATIENT
Start: 2021-04-15 | End: 2021-04-16

## 2021-04-15 RX ORDER — METRONIDAZOLE 250 MG/1
1000 TABLET ORAL 3 TIMES DAILY
Status: COMPLETED | OUTPATIENT
Start: 2021-04-15 | End: 2021-04-16

## 2021-04-15 RX ADMIN — METRONIDAZOLE 1000 MG: 250 TABLET ORAL at 14:37

## 2021-04-15 RX ADMIN — FAMOTIDINE 20 MG: 10 INJECTION INTRAVENOUS at 20:40

## 2021-04-15 RX ADMIN — SODIUM CHLORIDE 100 ML/HR: 900 INJECTION, SOLUTION INTRAVENOUS at 18:24

## 2021-04-15 RX ADMIN — Medication 10 ML: at 13:44

## 2021-04-15 RX ADMIN — ONDANSETRON 4 MG: 2 INJECTION INTRAMUSCULAR; INTRAVENOUS at 18:19

## 2021-04-15 RX ADMIN — MORPHINE SULFATE 4 MG: 4 INJECTION INTRAVENOUS at 18:19

## 2021-04-15 RX ADMIN — MORPHINE SULFATE 4 MG: 4 INJECTION INTRAVENOUS at 01:34

## 2021-04-15 RX ADMIN — MAGNESIUM CITRATE 296 ML: 1.75 LIQUID ORAL at 15:14

## 2021-04-15 RX ADMIN — NEOMYCIN SULFATE 1000 MG: 500 TABLET ORAL at 16:21

## 2021-04-15 RX ADMIN — ENOXAPARIN SODIUM 40 MG: 40 INJECTION SUBCUTANEOUS at 09:01

## 2021-04-15 RX ADMIN — MORPHINE SULFATE 4 MG: 4 INJECTION INTRAVENOUS at 14:37

## 2021-04-15 RX ADMIN — ONDANSETRON 4 MG: 2 INJECTION INTRAMUSCULAR; INTRAVENOUS at 07:43

## 2021-04-15 RX ADMIN — METRONIDAZOLE 1000 MG: 250 TABLET ORAL at 16:21

## 2021-04-15 RX ADMIN — Medication 10 ML: at 21:53

## 2021-04-15 RX ADMIN — SODIUM CHLORIDE 100 ML/HR: 900 INJECTION, SOLUTION INTRAVENOUS at 09:05

## 2021-04-15 RX ADMIN — FAMOTIDINE 20 MG: 10 INJECTION INTRAVENOUS at 09:01

## 2021-04-15 RX ADMIN — NEOMYCIN SULFATE 1000 MG: 500 TABLET ORAL at 15:13

## 2021-04-15 RX ADMIN — MORPHINE SULFATE 4 MG: 4 INJECTION INTRAVENOUS at 07:44

## 2021-04-15 RX ADMIN — MAGNESIUM CITRATE 296 ML: 1.75 LIQUID ORAL at 21:53

## 2021-04-15 NOTE — PROGRESS NOTES
Pt is alert, up ad lauri, no complaints at this time, wife by bedside    0700 Shift uneventful, pt up to bathroom without assist, Morphine given as ordered prn. Plan for surgery tomorrow by Dr. Mann Aguilar, no time posted at this time    0800 Bedside and Verbal shift change report given to Cuauhtemoc Dai RN (oncoming nurse) by Zabrina Rahman RN   (offgoing nurse). Report included the following information SBAR, Kardex, Intake/Output, MAR and Recent Results.

## 2021-04-15 NOTE — PROGRESS NOTES
9356  Bedside and verbal shift change report given to Montez Russell, RN (on coming nurse) by Tyrese Brown, RN (off going nurse). Report included the following information SBAR, Kardex, OR Summary, Intake/Output and MAR.    1745  Dr. oJe Greene orders a dietary supplement as ERAS protocol. Ordered Ensure Enlive. 1929  Bedside and verbal shift change report given by PEG Ram (off going nurse) to DARIEL Ribeiro RN(on coming nurse). Report included the following information SBAR, Kardex, OR Summary, Intake/Output and MAR.

## 2021-04-15 NOTE — PROGRESS NOTES
Problem: Falls - Risk of  Goal: *Absence of Falls  Description: Document Burrell Canavan Fall Risk and appropriate interventions in the flowsheet.   Outcome: Progressing Towards Goal  Note: Fall Risk Interventions:            Medication Interventions: Teach patient to arise slowly         History of Falls Interventions: Consult care management for discharge planning

## 2021-04-15 NOTE — PROGRESS NOTES
Hospitalist Progress Note    Patient: Kristina Sotelo MRN: [id number]  CSN: [id number]    YOB: 1970  Age: 48 y.o. Sex: male    DOA: 4/11/2021 LOS:  LOS: 3 days                Assessment/Plan     Patient Active Problem List   Diagnosis Code    Right sided abdominal pain R10.9    Colonic mass K63.89    Tobacco use Z72.0    Alcohol use Z67.80            51-year-old male with chewing tobacco use is admitted for mass in the ascending colon with intractable abdominal pain.     Right-sided abdominal pain due to ascending colon mass  suspicious for malignancy  S/p colonoscopy  Awaiting pathology  Colorectal surgery consulted. Plan for surgery on Friday  Oncology consulted. HTN -  Not on any home meds.   Monitor. Pain control    Disposition : TBD    Review of systems  General: No fevers or chills. Cardiovascular: No chest pain or pressure. No palpitations. Pulmonary: No shortness of breath. Gastrointestinal: No nausea, vomiting. Physical Exam:  General: Awake, cooperative, no acute distress    HEENT: NC, Atraumatic. PERRLA, anicteric sclerae. Lungs: CTA Bilaterally. No Wheezing/Rhonchi/Rales. Heart:  S1 S2,  No murmur, No Rubs, No Gallops  Abdomen: Soft, Non distended, gen tender.  +Bowel sounds,   Extremities: No c/c/e  Psych:   Not anxious or agitated. Neurologic:  No acute neurological deficit. Vital signs/Intake and Output:  Visit Vitals  BP (!) 148/95   Pulse 74   Temp 98.5 °F (36.9 °C)   Resp 16   Ht 5' 10\" (1.778 m)   Wt 86.7 kg (191 lb 3.2 oz)   SpO2 98%   BMI 27.43 kg/m²     Current Shift:  04/15 0701 - 04/15 1900  In: 0086 [P.O.:240;  I.V.:856]  Out: -   Last three shifts:  04/13 1901 - 04/15 0700  In: 4528 [I.V.:1730]  Out: -             Labs: Results:       Chemistry Recent Labs     04/15/21  0200 04/14/21  0330 04/13/21  0358   GLU 90 96 107*    138 138   K 3.5 3.6 3.9    102 101   CO2 33* 30 29   BUN 8 8 8   CREA 0.82 0.80 0.90   CA 8.0* 8.2* 8.6 AGAP 2* 6 8   BUCR 10* 10* 9*   AP 39* 45 52   TP 5.5* 6.1* 6.6   ALB 3.0* 3.3* 3.6   GLOB 2.5 2.8 3.0   AGRAT 1.2 1.2 1.2      CBC w/Diff Recent Labs     04/15/21  0200 04/14/21  0330 04/13/21  0358   WBC 4.7 6.6 13.2   RBC 3.68* 4.31* 4.39   HGB 11.2* 13.2 13.4   HCT 34.9* 40.6 41.4    207 250      Cardiac Enzymes No results for input(s): CPK, CKND1, AZUCENA in the last 72 hours. No lab exists for component: CKRMB, TROIP   Coagulation No results for input(s): PTP, INR, APTT, INREXT, INREXT in the last 72 hours. Lipid Panel No results found for: CHOL, CHOLPOCT, CHOLX, CHLST, CHOLV, 168890, HDL, HDLP, LDL, LDLC, DLDLP, 422416, VLDLC, VLDL, TGLX, TRIGL, TRIGP, TGLPOCT, CHHD, CHHDX   BNP No results for input(s): BNPP in the last 72 hours.    Liver Enzymes Recent Labs     04/15/21  0200   TP 5.5*   ALB 3.0*   AP 39*      Thyroid Studies No results found for: T4, T3U, TSH, TSHEXT, TSHEXT     Procedures/imaging: see electronic medical records for all procedures/Xrays and details which were not copied into this note but were reviewed prior to creation of Plan

## 2021-04-16 ENCOUNTER — ANESTHESIA (OUTPATIENT)
Dept: SURGERY | Age: 51
DRG: 330 | End: 2021-04-16
Payer: OTHER GOVERNMENT

## 2021-04-16 LAB
ABO + RH BLD: NORMAL
BLOOD GROUP ANTIBODIES SERPL: NORMAL
SPECIMEN EXP DATE BLD: NORMAL

## 2021-04-16 PROCEDURE — 77030003029 HC SUT VCRL J&J -B: Performed by: SURGERY

## 2021-04-16 PROCEDURE — 74011250637 HC RX REV CODE- 250/637: Performed by: SURGERY

## 2021-04-16 PROCEDURE — 77030028754 HC RCTRCTR LAPSCP ALX DSP AMR -B: Performed by: SURGERY

## 2021-04-16 PROCEDURE — 77030009403 HC ELECTRD ENDO MEGA -B: Performed by: SURGERY

## 2021-04-16 PROCEDURE — 77030010507 HC ADH SKN DERMBND J&J -B: Performed by: SURGERY

## 2021-04-16 PROCEDURE — 77030031139 HC SUT VCRL2 J&J -A: Performed by: SURGERY

## 2021-04-16 PROCEDURE — 77030033200 HC PRT CLSR CRTR THOMP COOP -C: Performed by: SURGERY

## 2021-04-16 PROCEDURE — 88307 TISSUE EXAM BY PATHOLOGIST: CPT

## 2021-04-16 PROCEDURE — 77030036731 HC STPLR ENDOSC J&J -F: Performed by: SURGERY

## 2021-04-16 PROCEDURE — 77030008574 HC TBNG SUC IRR STRY -B: Performed by: SURGERY

## 2021-04-16 PROCEDURE — 74011000258 HC RX REV CODE- 258: Performed by: SURGERY

## 2021-04-16 PROCEDURE — 8E0W4CZ ROBOTIC ASSISTED PROCEDURE OF TRUNK REGION, PERCUTANEOUS ENDOSCOPIC APPROACH: ICD-10-PCS | Performed by: SURGERY

## 2021-04-16 PROCEDURE — 77030040830 HC CATH URETH FOL MDII -A: Performed by: SURGERY

## 2021-04-16 PROCEDURE — 88313 SPECIAL STAINS GROUP 2: CPT

## 2021-04-16 PROCEDURE — 88341 IMHCHEM/IMCYTCHM EA ADD ANTB: CPT

## 2021-04-16 PROCEDURE — 77030002933 HC SUT MCRYL J&J -A: Performed by: SURGERY

## 2021-04-16 PROCEDURE — 77030009979 HC RELD STPLR TCR J&J -C: Performed by: SURGERY

## 2021-04-16 PROCEDURE — 77030008477 HC STYL SATN SLP COVD -A: Performed by: ANESTHESIOLOGY

## 2021-04-16 PROCEDURE — 77030032522 HC SHT STPL PK ENDOWR INTU -B: Performed by: SURGERY

## 2021-04-16 PROCEDURE — 77030035279 HC SEAL VSL ENDOWR XI INTU -I2: Performed by: SURGERY

## 2021-04-16 PROCEDURE — 77030040259 HC STPLR DEV SUREFORM DVNCI INTU -F: Performed by: SURGERY

## 2021-04-16 PROCEDURE — 77030032490 HC SLV COMPR SCD KNE COVD -B: Performed by: SURGERY

## 2021-04-16 PROCEDURE — 77030040934 HC CATH DIAG DXTERITY MEDT -A: Performed by: SURGERY

## 2021-04-16 PROCEDURE — 74011000250 HC RX REV CODE- 250: Performed by: ANESTHESIOLOGY

## 2021-04-16 PROCEDURE — 65270000029 HC RM PRIVATE

## 2021-04-16 PROCEDURE — 76060000041 HC ANESTHESIA 5 TO 5.5 HR: Performed by: SURGERY

## 2021-04-16 PROCEDURE — 74011000272 HC RX REV CODE- 272: Performed by: SURGERY

## 2021-04-16 PROCEDURE — 88342 IMHCHEM/IMCYTCHM 1ST ANTB: CPT

## 2021-04-16 PROCEDURE — 77030008683 HC TU ET CUF COVD -A: Performed by: ANESTHESIOLOGY

## 2021-04-16 PROCEDURE — 2709999900 HC NON-CHARGEABLE SUPPLY: Performed by: SURGERY

## 2021-04-16 PROCEDURE — C9290 INJ, BUPIVACAINE LIPOSOME: HCPCS | Performed by: SURGERY

## 2021-04-16 PROCEDURE — 74011000250 HC RX REV CODE- 250: Performed by: FAMILY MEDICINE

## 2021-04-16 PROCEDURE — 77030035489 HC REDUCR CANN ENDOWR INTU -C: Performed by: SURGERY

## 2021-04-16 PROCEDURE — 77030020703 HC SEAL CANN DISP INTU -B: Performed by: SURGERY

## 2021-04-16 PROCEDURE — 77030033138 HC SUT PGA STRATFX J&J -B: Performed by: SURGERY

## 2021-04-16 PROCEDURE — 74011250636 HC RX REV CODE- 250/636: Performed by: SURGERY

## 2021-04-16 PROCEDURE — 76210000006 HC OR PH I REC 0.5 TO 1 HR: Performed by: SURGERY

## 2021-04-16 PROCEDURE — 77030037241 HC PRT ACC BLDLSS AIRSEAL CNMD -B: Performed by: SURGERY

## 2021-04-16 PROCEDURE — 77030040361 HC SLV COMPR DVT MDII -B: Performed by: SURGERY

## 2021-04-16 PROCEDURE — 77030020782 HC GWN BAIR PAWS FLX 3M -B: Performed by: SURGERY

## 2021-04-16 PROCEDURE — 76010000882 HC OR TIME 5 TO 5.5HR INTENSV - TIER 2: Performed by: SURGERY

## 2021-04-16 PROCEDURE — 77030019908 HC STETH ESOPH SIMS -A: Performed by: ANESTHESIOLOGY

## 2021-04-16 PROCEDURE — 36415 COLL VENOUS BLD VENIPUNCTURE: CPT

## 2021-04-16 PROCEDURE — 77030035278 HC STPLR SEAL ENDOWR INTU -B: Performed by: SURGERY

## 2021-04-16 PROCEDURE — 77030040260 HC STPLR RELD SUREFORM DVNCI INTU -C: Performed by: SURGERY

## 2021-04-16 PROCEDURE — 77030002966 HC SUT PDS J&J -A: Performed by: SURGERY

## 2021-04-16 PROCEDURE — 77030006643: Performed by: ANESTHESIOLOGY

## 2021-04-16 PROCEDURE — 77010033678 HC OXYGEN DAILY

## 2021-04-16 PROCEDURE — 74011000254 HC RX REV CODE- 254: Performed by: ANESTHESIOLOGY

## 2021-04-16 PROCEDURE — 74011250636 HC RX REV CODE- 250/636: Performed by: ANESTHESIOLOGY

## 2021-04-16 PROCEDURE — 74011000250 HC RX REV CODE- 250: Performed by: SURGERY

## 2021-04-16 PROCEDURE — 86901 BLOOD TYPING SEROLOGIC RH(D): CPT

## 2021-04-16 PROCEDURE — 77030016151 HC PROTCTR LNS DFOG COVD -B: Performed by: SURGERY

## 2021-04-16 PROCEDURE — 77030034479 HC ADH SKN CLSR PRINEO J&J -B: Performed by: SURGERY

## 2021-04-16 PROCEDURE — 77030035277 HC OBTRTR BLDELSS DISP INTU -B: Performed by: SURGERY

## 2021-04-16 PROCEDURE — 77030002986 HC SUT PROL J&J -A: Performed by: SURGERY

## 2021-04-16 PROCEDURE — 74011250636 HC RX REV CODE- 250/636: Performed by: FAMILY MEDICINE

## 2021-04-16 PROCEDURE — 88309 TISSUE EXAM BY PATHOLOGIST: CPT

## 2021-04-16 PROCEDURE — 0DTF4ZZ RESECTION OF RIGHT LARGE INTESTINE, PERCUTANEOUS ENDOSCOPIC APPROACH: ICD-10-PCS | Performed by: SURGERY

## 2021-04-16 PROCEDURE — 3E0T3BZ INTRODUCTION OF ANESTHETIC AGENT INTO PERIPHERAL NERVES AND PLEXI, PERCUTANEOUS APPROACH: ICD-10-PCS | Performed by: SURGERY

## 2021-04-16 PROCEDURE — 0FB04ZX EXCISION OF LIVER, PERCUTANEOUS ENDOSCOPIC APPROACH, DIAGNOSTIC: ICD-10-PCS | Performed by: SURGERY

## 2021-04-16 PROCEDURE — 77030027138 HC INCENT SPIROMETER -A

## 2021-04-16 RX ORDER — ROCURONIUM BROMIDE 10 MG/ML
INJECTION, SOLUTION INTRAVENOUS AS NEEDED
Status: DISCONTINUED | OUTPATIENT
Start: 2021-04-16 | End: 2021-04-16 | Stop reason: HOSPADM

## 2021-04-16 RX ORDER — SODIUM CHLORIDE 9 MG/ML
500 INJECTION, SOLUTION INTRAVENOUS CONTINUOUS
Status: DISCONTINUED | OUTPATIENT
Start: 2021-04-16 | End: 2021-04-16

## 2021-04-16 RX ORDER — KETOROLAC TROMETHAMINE 15 MG/ML
INJECTION, SOLUTION INTRAMUSCULAR; INTRAVENOUS AS NEEDED
Status: DISCONTINUED | OUTPATIENT
Start: 2021-04-16 | End: 2021-04-16 | Stop reason: HOSPADM

## 2021-04-16 RX ORDER — DEXTROSE, SODIUM CHLORIDE, AND POTASSIUM CHLORIDE 5; .45; .15 G/100ML; G/100ML; G/100ML
100 INJECTION INTRAVENOUS CONTINUOUS
Status: DISCONTINUED | OUTPATIENT
Start: 2021-04-16 | End: 2021-04-17 | Stop reason: ALTCHOICE

## 2021-04-16 RX ORDER — FLUMAZENIL 0.1 MG/ML
0.2 INJECTION INTRAVENOUS
Status: DISCONTINUED | OUTPATIENT
Start: 2021-04-16 | End: 2021-04-16 | Stop reason: HOSPADM

## 2021-04-16 RX ORDER — KETOROLAC TROMETHAMINE 30 MG/ML
30 INJECTION, SOLUTION INTRAMUSCULAR; INTRAVENOUS EVERY 6 HOURS
Status: DISCONTINUED | OUTPATIENT
Start: 2021-04-16 | End: 2021-04-17 | Stop reason: HOSPADM

## 2021-04-16 RX ORDER — SODIUM CHLORIDE 0.9 % (FLUSH) 0.9 %
5-40 SYRINGE (ML) INJECTION EVERY 8 HOURS
Status: DISCONTINUED | OUTPATIENT
Start: 2021-04-16 | End: 2021-04-17 | Stop reason: HOSPADM

## 2021-04-16 RX ORDER — KETAMINE HYDROCHLORIDE 10 MG/ML
INJECTION, SOLUTION INTRAMUSCULAR; INTRAVENOUS AS NEEDED
Status: DISCONTINUED | OUTPATIENT
Start: 2021-04-16 | End: 2021-04-16 | Stop reason: HOSPADM

## 2021-04-16 RX ORDER — METRONIDAZOLE 500 MG/100ML
500 INJECTION, SOLUTION INTRAVENOUS ONCE
Status: COMPLETED | OUTPATIENT
Start: 2021-04-16 | End: 2021-04-16

## 2021-04-16 RX ORDER — INDOCYANINE GREEN AND WATER 25 MG
KIT INJECTION AS NEEDED
Status: DISCONTINUED | OUTPATIENT
Start: 2021-04-16 | End: 2021-04-16 | Stop reason: HOSPADM

## 2021-04-16 RX ORDER — HEPARIN SODIUM 5000 [USP'U]/ML
5000 INJECTION, SOLUTION INTRAVENOUS; SUBCUTANEOUS ONCE
Status: COMPLETED | OUTPATIENT
Start: 2021-04-16 | End: 2021-04-16

## 2021-04-16 RX ORDER — SODIUM CHLORIDE, SODIUM LACTATE, POTASSIUM CHLORIDE, CALCIUM CHLORIDE 600; 310; 30; 20 MG/100ML; MG/100ML; MG/100ML; MG/100ML
INJECTION, SOLUTION INTRAVENOUS
Status: DISCONTINUED | OUTPATIENT
Start: 2021-04-16 | End: 2021-04-16 | Stop reason: HOSPADM

## 2021-04-16 RX ORDER — GABAPENTIN 100 MG/1
100 CAPSULE ORAL 3 TIMES DAILY
Status: DISCONTINUED | OUTPATIENT
Start: 2021-04-16 | End: 2021-04-17 | Stop reason: HOSPADM

## 2021-04-16 RX ORDER — PROPOFOL 10 MG/ML
INJECTION, EMULSION INTRAVENOUS AS NEEDED
Status: DISCONTINUED | OUTPATIENT
Start: 2021-04-16 | End: 2021-04-16 | Stop reason: HOSPADM

## 2021-04-16 RX ORDER — DEXMEDETOMIDINE HYDROCHLORIDE 100 UG/ML
INJECTION, SOLUTION INTRAVENOUS AS NEEDED
Status: DISCONTINUED | OUTPATIENT
Start: 2021-04-16 | End: 2021-04-16 | Stop reason: HOSPADM

## 2021-04-16 RX ORDER — ONDANSETRON 2 MG/ML
INJECTION INTRAMUSCULAR; INTRAVENOUS AS NEEDED
Status: DISCONTINUED | OUTPATIENT
Start: 2021-04-16 | End: 2021-04-16 | Stop reason: HOSPADM

## 2021-04-16 RX ORDER — MAGNESIUM SULFATE HEPTAHYDRATE 40 MG/ML
2 INJECTION, SOLUTION INTRAVENOUS AS NEEDED
Status: DISCONTINUED | OUTPATIENT
Start: 2021-04-16 | End: 2021-04-17 | Stop reason: HOSPADM

## 2021-04-16 RX ORDER — FENTANYL CITRATE 50 UG/ML
INJECTION, SOLUTION INTRAMUSCULAR; INTRAVENOUS AS NEEDED
Status: DISCONTINUED | OUTPATIENT
Start: 2021-04-16 | End: 2021-04-16 | Stop reason: HOSPADM

## 2021-04-16 RX ORDER — DIPHENHYDRAMINE HYDROCHLORIDE 50 MG/ML
12.5 INJECTION, SOLUTION INTRAMUSCULAR; INTRAVENOUS
Status: DISCONTINUED | OUTPATIENT
Start: 2021-04-16 | End: 2021-04-16 | Stop reason: HOSPADM

## 2021-04-16 RX ORDER — SODIUM CHLORIDE, SODIUM LACTATE, POTASSIUM CHLORIDE, CALCIUM CHLORIDE 600; 310; 30; 20 MG/100ML; MG/100ML; MG/100ML; MG/100ML
1000 INJECTION, SOLUTION INTRAVENOUS CONTINUOUS
Status: DISCONTINUED | OUTPATIENT
Start: 2021-04-16 | End: 2021-04-16 | Stop reason: HOSPADM

## 2021-04-16 RX ORDER — FENTANYL CITRATE 50 UG/ML
25 INJECTION, SOLUTION INTRAMUSCULAR; INTRAVENOUS AS NEEDED
Status: DISCONTINUED | OUTPATIENT
Start: 2021-04-16 | End: 2021-04-16 | Stop reason: HOSPADM

## 2021-04-16 RX ORDER — LIDOCAINE HYDROCHLORIDE 20 MG/ML
INJECTION, SOLUTION EPIDURAL; INFILTRATION; INTRACAUDAL; PERINEURAL AS NEEDED
Status: DISCONTINUED | OUTPATIENT
Start: 2021-04-16 | End: 2021-04-16 | Stop reason: HOSPADM

## 2021-04-16 RX ORDER — DEXAMETHASONE SODIUM PHOSPHATE 4 MG/ML
INJECTION, SOLUTION INTRA-ARTICULAR; INTRALESIONAL; INTRAMUSCULAR; INTRAVENOUS; SOFT TISSUE AS NEEDED
Status: DISCONTINUED | OUTPATIENT
Start: 2021-04-16 | End: 2021-04-16 | Stop reason: HOSPADM

## 2021-04-16 RX ORDER — HYDROMORPHONE HYDROCHLORIDE 1 MG/ML
0.5 INJECTION, SOLUTION INTRAMUSCULAR; INTRAVENOUS; SUBCUTANEOUS
Status: DISCONTINUED | OUTPATIENT
Start: 2021-04-16 | End: 2021-04-16 | Stop reason: HOSPADM

## 2021-04-16 RX ORDER — ALBUTEROL SULFATE 0.83 MG/ML
2.5 SOLUTION RESPIRATORY (INHALATION) AS NEEDED
Status: DISCONTINUED | OUTPATIENT
Start: 2021-04-16 | End: 2021-04-16 | Stop reason: HOSPADM

## 2021-04-16 RX ORDER — OXYCODONE AND ACETAMINOPHEN 5; 325 MG/1; MG/1
1 TABLET ORAL AS NEEDED
Status: DISCONTINUED | OUTPATIENT
Start: 2021-04-16 | End: 2021-04-16 | Stop reason: HOSPADM

## 2021-04-16 RX ORDER — ACETAMINOPHEN 500 MG
1000 TABLET ORAL 3 TIMES DAILY
Status: DISCONTINUED | OUTPATIENT
Start: 2021-04-16 | End: 2021-04-17 | Stop reason: HOSPADM

## 2021-04-16 RX ORDER — SODIUM CHLORIDE 9 MG/ML
125 INJECTION, SOLUTION INTRAVENOUS CONTINUOUS
Status: DISCONTINUED | OUTPATIENT
Start: 2021-04-16 | End: 2021-04-16

## 2021-04-16 RX ORDER — ENOXAPARIN SODIUM 100 MG/ML
40 INJECTION SUBCUTANEOUS DAILY
Status: DISCONTINUED | OUTPATIENT
Start: 2021-04-17 | End: 2021-04-17 | Stop reason: HOSPADM

## 2021-04-16 RX ORDER — SODIUM CHLORIDE 0.9 % (FLUSH) 0.9 %
5-40 SYRINGE (ML) INJECTION AS NEEDED
Status: DISCONTINUED | OUTPATIENT
Start: 2021-04-16 | End: 2021-04-17 | Stop reason: HOSPADM

## 2021-04-16 RX ORDER — HYDROMORPHONE HYDROCHLORIDE 2 MG/ML
INJECTION, SOLUTION INTRAMUSCULAR; INTRAVENOUS; SUBCUTANEOUS AS NEEDED
Status: DISCONTINUED | OUTPATIENT
Start: 2021-04-16 | End: 2021-04-16 | Stop reason: HOSPADM

## 2021-04-16 RX ORDER — NALOXONE HYDROCHLORIDE 0.4 MG/ML
0.2 INJECTION, SOLUTION INTRAMUSCULAR; INTRAVENOUS; SUBCUTANEOUS AS NEEDED
Status: DISCONTINUED | OUTPATIENT
Start: 2021-04-16 | End: 2021-04-16 | Stop reason: HOSPADM

## 2021-04-16 RX ORDER — LABETALOL HCL 20 MG/4 ML
SYRINGE (ML) INTRAVENOUS AS NEEDED
Status: DISCONTINUED | OUTPATIENT
Start: 2021-04-16 | End: 2021-04-16 | Stop reason: HOSPADM

## 2021-04-16 RX ADMIN — INDOCYANINE GREEN AND WATER 7.5 MG: KIT at 10:40

## 2021-04-16 RX ADMIN — NEOMYCIN SULFATE 1000 MG: 500 TABLET ORAL at 00:04

## 2021-04-16 RX ADMIN — DEXMEDETOMIDINE HYDROCHLORIDE 8 MCG: 100 INJECTION, SOLUTION INTRAVENOUS at 12:14

## 2021-04-16 RX ADMIN — ONDANSETRON 4 MG: 2 INJECTION INTRAMUSCULAR; INTRAVENOUS at 00:05

## 2021-04-16 RX ADMIN — HYDROMORPHONE HYDROCHLORIDE 1 MG: 2 INJECTION, SOLUTION INTRAMUSCULAR; INTRAVENOUS; SUBCUTANEOUS at 08:32

## 2021-04-16 RX ADMIN — LABETALOL 20 MG/4 ML (5 MG/ML) INTRAVENOUS SYRINGE 10 MG: at 09:37

## 2021-04-16 RX ADMIN — FENTANYL CITRATE 100 MCG: 50 INJECTION, SOLUTION INTRAMUSCULAR; INTRAVENOUS at 07:50

## 2021-04-16 RX ADMIN — ROCURONIUM BROMIDE 10 MG: 10 INJECTION, SOLUTION INTRAVENOUS at 11:58

## 2021-04-16 RX ADMIN — INDOCYANINE GREEN AND WATER 7.5 MG: KIT at 09:49

## 2021-04-16 RX ADMIN — ROCURONIUM BROMIDE 20 MG: 10 INJECTION, SOLUTION INTRAVENOUS at 08:25

## 2021-04-16 RX ADMIN — ROCURONIUM BROMIDE 50 MG: 10 INJECTION, SOLUTION INTRAVENOUS at 07:52

## 2021-04-16 RX ADMIN — GABAPENTIN 100 MG: 100 CAPSULE ORAL at 21:40

## 2021-04-16 RX ADMIN — DEXMEDETOMIDINE HYDROCHLORIDE 8 MCG: 100 INJECTION, SOLUTION INTRAVENOUS at 08:34

## 2021-04-16 RX ADMIN — GABAPENTIN 100 MG: 100 CAPSULE ORAL at 16:32

## 2021-04-16 RX ADMIN — PROPOFOL 150 MG: 10 INJECTION, EMULSION INTRAVENOUS at 07:51

## 2021-04-16 RX ADMIN — KETOROLAC TROMETHAMINE 30 MG: 15 INJECTION, SOLUTION INTRAMUSCULAR; INTRAVENOUS at 12:30

## 2021-04-16 RX ADMIN — DEXTROSE MONOHYDRATE, SODIUM CHLORIDE, AND POTASSIUM CHLORIDE 100 ML/HR: 50; 4.5; 1.49 INJECTION, SOLUTION INTRAVENOUS at 17:28

## 2021-04-16 RX ADMIN — SUGAMMADEX 180 MG: 100 INJECTION, SOLUTION INTRAVENOUS at 12:55

## 2021-04-16 RX ADMIN — DEXMEDETOMIDINE HYDROCHLORIDE 8 MCG: 100 INJECTION, SOLUTION INTRAVENOUS at 08:26

## 2021-04-16 RX ADMIN — Medication 10 ML: at 21:43

## 2021-04-16 RX ADMIN — WATER 2 G: 1 INJECTION INTRAMUSCULAR; INTRAVENOUS; SUBCUTANEOUS at 08:05

## 2021-04-16 RX ADMIN — ACETAMINOPHEN 1000 MG: 500 TABLET ORAL at 16:32

## 2021-04-16 RX ADMIN — ACETAMINOPHEN 1000 MG: 500 TABLET ORAL at 21:40

## 2021-04-16 RX ADMIN — ONDANSETRON HYDROCHLORIDE 4 MG: 2 INJECTION INTRAMUSCULAR; INTRAVENOUS at 08:06

## 2021-04-16 RX ADMIN — Medication 10 ML: at 18:11

## 2021-04-16 RX ADMIN — DEXMEDETOMIDINE HYDROCHLORIDE 16 MCG: 100 INJECTION, SOLUTION INTRAVENOUS at 08:29

## 2021-04-16 RX ADMIN — KETOROLAC TROMETHAMINE 30 MG: 30 INJECTION, SOLUTION INTRAMUSCULAR at 17:27

## 2021-04-16 RX ADMIN — SODIUM CHLORIDE 125 ML/HR: 900 INJECTION, SOLUTION INTRAVENOUS at 07:23

## 2021-04-16 RX ADMIN — ROCURONIUM BROMIDE 10 MG: 10 INJECTION, SOLUTION INTRAVENOUS at 12:10

## 2021-04-16 RX ADMIN — SODIUM CHLORIDE, SODIUM LACTATE, POTASSIUM CHLORIDE, AND CALCIUM CHLORIDE: 600; 310; 30; 20 INJECTION, SOLUTION INTRAVENOUS at 10:02

## 2021-04-16 RX ADMIN — Medication 8 ML: at 16:00

## 2021-04-16 RX ADMIN — KETAMINE HYDROCHLORIDE 20 MG: 10 INJECTION, SOLUTION INTRAMUSCULAR; INTRAVENOUS at 08:24

## 2021-04-16 RX ADMIN — METRONIDAZOLE 1000 MG: 250 TABLET ORAL at 00:04

## 2021-04-16 RX ADMIN — DEXAMETHASONE SODIUM PHOSPHATE 4 MG: 4 INJECTION, SOLUTION INTRAMUSCULAR; INTRAVENOUS at 08:06

## 2021-04-16 RX ADMIN — ONDANSETRON 4 MG: 2 INJECTION INTRAMUSCULAR; INTRAVENOUS at 15:33

## 2021-04-16 RX ADMIN — FAMOTIDINE 20 MG: 10 INJECTION INTRAVENOUS at 21:41

## 2021-04-16 RX ADMIN — DEXMEDETOMIDINE HYDROCHLORIDE 8 MCG: 100 INJECTION, SOLUTION INTRAVENOUS at 09:30

## 2021-04-16 RX ADMIN — ROCURONIUM BROMIDE 20 MG: 10 INJECTION, SOLUTION INTRAVENOUS at 09:35

## 2021-04-16 RX ADMIN — ROCURONIUM BROMIDE 20 MG: 10 INJECTION, SOLUTION INTRAVENOUS at 11:14

## 2021-04-16 RX ADMIN — METRONIDAZOLE 500 MG: 500 INJECTION, SOLUTION INTRAVENOUS at 07:48

## 2021-04-16 RX ADMIN — LIDOCAINE HYDROCHLORIDE 100 MG: 20 INJECTION, SOLUTION EPIDURAL; INFILTRATION; INTRACAUDAL; PERINEURAL at 07:50

## 2021-04-16 RX ADMIN — KETAMINE HYDROCHLORIDE 30 MG: 10 INJECTION, SOLUTION INTRAMUSCULAR; INTRAVENOUS at 08:17

## 2021-04-16 RX ADMIN — INDOCYANINE GREEN AND WATER 5 MG: KIT at 11:03

## 2021-04-16 RX ADMIN — ROCURONIUM BROMIDE 20 MG: 10 INJECTION, SOLUTION INTRAVENOUS at 10:13

## 2021-04-16 RX ADMIN — HEPARIN SODIUM 5000 UNITS: 5000 INJECTION INTRAVENOUS; SUBCUTANEOUS at 07:26

## 2021-04-16 RX ADMIN — Medication 10 ML: at 21:41

## 2021-04-16 RX ADMIN — SODIUM CHLORIDE, SODIUM LACTATE, POTASSIUM CHLORIDE, AND CALCIUM CHLORIDE: 600; 310; 30; 20 INJECTION, SOLUTION INTRAVENOUS at 12:29

## 2021-04-16 RX ADMIN — MORPHINE SULFATE 4 MG: 4 INJECTION INTRAVENOUS at 00:04

## 2021-04-16 RX ADMIN — KETOROLAC TROMETHAMINE 30 MG: 30 INJECTION, SOLUTION INTRAMUSCULAR at 23:42

## 2021-04-16 NOTE — ANESTHESIA PREPROCEDURE EVALUATION
Relevant Problems   No relevant active problems       Anesthetic History   No history of anesthetic complications            Review of Systems / Medical History  Patient summary reviewed, nursing notes reviewed and pertinent labs reviewed    Pulmonary  Within defined limits                 Neuro/Psych   Within defined limits           Cardiovascular    Hypertension              Exercise tolerance: >4 METS     GI/Hepatic/Renal     GERD: well controlled           Endo/Other  Within defined limits           Other Findings              Physical Exam    Airway  Mallampati: II  TM Distance: 4 - 6 cm  Neck ROM: normal range of motion   Mouth opening: Normal     Cardiovascular               Dental  No notable dental hx       Pulmonary                 Abdominal         Other Findings            Anesthetic Plan    ASA: 2  Anesthesia type: general          Induction: Intravenous  Anesthetic plan and risks discussed with: Patient

## 2021-04-16 NOTE — PROGRESS NOTES
0715 Received bedside shift report from off going nurse RN. Report included the following information SBAR, Kardex, Intake/Output, MAR and Recent Results. 1100 Dr. Vipul Fink left a message at the desk that the ordered Chest x-ray should be done outpatient not inpatient. Calling to verify details. 1400 Pt returned to floor. 1640 Walked down hallway with patient. Patient tolerated very well. Patient now sitting up in bedside chair. Made patient aware of safety concerns with IV lines, SCD's and Catheter. 1953 Gave bedside shift report to oncoming nurse Casey Bowser. Report included the following information: SBAR, Kardex, Intake/Output, MAR and Recent Results.

## 2021-04-16 NOTE — PERIOP NOTES
Paged Dr. Izzy Jones for patient sign out. Patient meets criteria for transfer to the next phase of care.

## 2021-04-16 NOTE — ANESTHESIA POSTPROCEDURE EVALUATION
Post-Anesthesia Evaluation and Assessment    Cardiovascular Function/Vital Signs  Visit Vitals  BP (!) 148/87 (BP 1 Location: Right upper arm, BP Patient Position: At rest;Supine)   Pulse 87   Temp 36.9 °C (98.4 °F)   Resp 13   Ht 5' 10\" (1.778 m)   Wt 85.5 kg (188 lb 8 oz)   SpO2 98%   BMI 27.05 kg/m²       Patient is status post Procedure(s):  ROBOTIC RIGHT COLECTOMY DAVINCI with LIVER WEDGE BIOPSY. Nausea/Vomiting: Controlled. Postoperative hydration reviewed and adequate. Pain:  Pain Scale 1: Numeric (0 - 10) (04/16/21 1330)  Pain Intensity 1: 2 (04/16/21 1330)   Managed. Neurological Status:   Neuro (WDL): Within Defined Limits (04/16/21 1330)   At baseline. Mental Status and Level of Consciousness: Baseline and appropriate for discharge. Pulmonary Status:   O2 Device: Nasal cannula (04/16/21 1330)   Adequate oxygenation and airway patent. Complications related to anesthesia: None    Post-anesthesia assessment completed. No concerns. Patient has met all discharge requirements.     Signed By: Priscila Salazar MD    April 16, 2021

## 2021-04-16 NOTE — PERIOP NOTES
TRANSFER - OUT REPORT:    Verbal report given to Miguel Moss RN on [employer]  being transferred to [facility] for routine post - op       Report consisted of patients Situation, Background, Assessment and   Recommendations(SBAR). Information from the following report(s) SBAR, Kardex, Procedure Summary, Intake/Output and MAR was reviewed with the receiving nurse. Lines:   Peripheral IV 04/11/21 Left Antecubital (Active)   Site Assessment Clean, dry, & intact 04/16/21 1330   Phlebitis Assessment 0 04/16/21 1330   Infiltration Assessment 0 04/16/21 1330   Dressing Status Clean, dry, & intact 04/16/21 1330   Dressing Type Tape;Transparent 04/16/21 1330   Hub Color/Line Status Pink; Infusing;Patent 04/16/21 1330   Action Taken Other (comment) 04/16/21 0700   Alcohol Cap Used Yes 04/15/21 0900       Peripheral IV 04/16/21 Right; Outer Hand (Active)   Site Assessment Clean, dry, & intact 04/16/21 1330   Phlebitis Assessment 0 04/16/21 1330   Infiltration Assessment 0 04/16/21 1330   Dressing Status Clean, dry, & intact 04/16/21 1330   Dressing Type Tape;Transparent 04/16/21 1330   Hub Color/Line Status Pink; Infusing;Patent 04/16/21 1330   Alcohol Cap Used No 04/16/21 0729        Opportunity for questions and clarification was provided.       Patient transported with:   O2 @ 2 liters  Registered Nurse  Tech     Visit Vitals  BP (!) 148/87 (BP 1 Location: Right upper arm, BP Patient Position: At rest;Supine)   Pulse 87   Temp 98.4 °F (36.9 °C)   Resp 13   Ht 5' 10\" (1.778 m)   Wt 85.5 kg (188 lb 8 oz)   SpO2 98%   BMI 27.05 kg/m²       Intake/Output Summary (Last 24 hours) at 4/16/2021 1348  Last data filed at 4/16/2021 1345  Gross per 24 hour   Intake 3071 ml   Output 725 ml   Net 2346 ml

## 2021-04-16 NOTE — INTERVAL H&P NOTE
Update History & Physical    The Patient's History and Physical of April 16, 2021 was reviewed with the patient and I examined the patient. There was no change. The surgical site was confirmed by the patient and me. Plan:  The risk, benefits, expected outcome, and alternative to the recommended procedure have been discussed with the patient. Patient understands and wants to proceed with the procedure.     Electronically signed by Ulises Ro DO on 4/16/2021 at 7:18 AM

## 2021-04-16 NOTE — PROGRESS NOTES
Pt and wife stated that Dr. Harjinder Cruz had told them this afternoon that pt is to get 2 bowel prep. Was given Mag citrate by day shift nurse and read in progress note that pt to get Ensure Enlive drink as dietary supplement, no other orders written by surgeon seen. Called on-call MD and was given order to give another dose of Mag citrate. Called nursing supervisor for dietary supplement. Ensure clear provided    0010 CHG wipes given, linens and gown changed. Wife assisting with ADL's. Surgery planned for 0730 by Dr. Harjinder Cruz    0430 CHG wipes and nasal antiseptic provided this am, linens and gown changed. Consent for surgery signed, pre-procedure checklist done. Pt did not want IVF restarted at this time, will inform OR nurse in report    1291 TRANSFER - OUT REPORT:    Verbal report given to [facility], RN(name) on [employer]  being transferred to OR(unit) for ordered procedure       Report consisted of patients Situation, Background, Assessment and   Recommendations(SBAR). Information from the following report(s) SBAR, Kardex, Intake/Output, MAR, Recent Results and Pre Procedure Checklist was reviewed with the receiving nurse. Lines:   Peripheral IV 04/11/21 Left Antecubital (Active)   Site Assessment Clean, dry, & intact 04/15/21 2040   Phlebitis Assessment 0 04/15/21 0900   Infiltration Assessment 0 04/15/21 0900   Dressing Status Clean, dry, & intact 04/15/21 0900   Dressing Type Tape;Transparent 04/15/21 0900   Hub Color/Line Status Pink; Infusing 04/15/21 0900   Action Taken Open ports on tubing capped 04/15/21 0900   Alcohol Cap Used Yes 04/15/21 0900        Opportunity for questions and clarification was provided. Patient transported with:   Tech    0800 Verbal shift change report given to Pamela Castorena RN (oncoming nurse) by Aziza Banks RN   (offgoing nurse). Report included the following information SBAR, Kardex, Intake/Output, MAR and Recent Results.  Pt in OR at this time

## 2021-04-16 NOTE — PROGRESS NOTES
Hospitalist Progress Note    Patient: Alise Goddard MRN: [id number]  CSN: [id number]    YOB: 1970  Age: 48 y.o. Sex: male    DOA: 4/11/2021 LOS:  LOS: 4 days          Chief Complaint:    Colon mass      Assessment/Plan        51-year-old male with chewing tobacco use is admitted for mass in the ascending colon with intractable abdominal pain.     Right-sided abdominal pain due to ascending colon mass  Malignant  Workup includes CT chest to be done and final path pending from surgery    S/p right colectomy today    IVF  pepcid IV  Pain control    Diet as per surgery    Monitor BP    Repeat labs in am    Disposition :  Patient Active Problem List   Diagnosis Code    Right sided abdominal pain R10.9    Colonic mass K63.89    Tobacco use Z72.0    Alcohol use Z72.89       Subjective:  Doing ok  Back in room from PACU  Had his surgery  Pain minimal for now      Review of systems:      Respiratory: denies SOB,  Cardiovascular: denies chest pain  Gastrointestinal: denies nausea, vomiting      Vital signs/Intake and Output:  Visit Vitals  BP (!) 149/97 (BP 1 Location: Right upper arm, BP Patient Position: At rest;Lying)   Pulse 75   Temp 98.7 °F (37.1 °C)   Resp 17   Ht 5' 10\" (1.778 m)   Wt 85.5 kg (188 lb 8 oz)   SpO2 99%   BMI 27.05 kg/m²     Current Shift:  04/16 0701 - 04/16 1900  In: 500 [I.V.:500]  Out: -   Last three shifts:  04/14 1901 - 04/16 0700  In: 2514 [P.O.:240;  I.V.:2274]  Out: -     Exam:    General: Well developed, alert, NAD, OX3  CVS:Regular rate and rhythm, no M/R/G, S1/S2 heard, no thrill  Lungs:Clear to auscultation bilaterally, no wheezes, rhonchi, or rales  Abdomen: midline incision dressed and laparoscopic incisions dressed left side abd, ND  Extremities: No C/C/E, pulses palpable 2+  Neuro:grossly normal , follows commands  Psych:appropriate                Labs: Results:       Chemistry Recent Labs     04/15/21  0200 04/14/21  0330   GLU 90 96    138   K 3.5 3.6  102   CO2 33* 30   BUN 8 8   CREA 0.82 0.80   CA 8.0* 8.2*   AGAP 2* 6   BUCR 10* 10*   AP 39* 45   TP 5.5* 6.1*   ALB 3.0* 3.3*   GLOB 2.5 2.8   AGRAT 1.2 1.2      CBC w/Diff Recent Labs     04/15/21  0200 04/14/21  0330   WBC 4.7 6.6   RBC 3.68* 4.31*   HGB 11.2* 13.2   HCT 34.9* 40.6    207      Cardiac Enzymes No results for input(s): CPK, CKND1, AZUCENA in the last 72 hours. No lab exists for component: CKRMB, TROIP   Coagulation No results for input(s): PTP, INR, APTT, INREXT in the last 72 hours. Lipid Panel No results found for: CHOL, CHOLPOCT, CHOLX, CHLST, CHOLV, 810277, HDL, HDLP, LDL, LDLC, DLDLP, 292696, VLDLC, VLDL, TGLX, TRIGL, TRIGP, TGLPOCT, CHHD, CHHDX   BNP No results for input(s): BNPP in the last 72 hours.    Liver Enzymes Recent Labs     04/15/21  0200   TP 5.5*   ALB 3.0*   AP 39*      Thyroid Studies No results found for: T4, T3U, TSH, TSHEXT     Procedures/imaging: see electronic medical records for all procedures/Xrays and details which were not copied into this note but were reviewed prior to creation of Farida Francisco MD

## 2021-04-16 NOTE — PROGRESS NOTES
Noted plan for urgent surgical resection of the right colon this morning. Please encourage ambulation as appropriate to assist with identifying potential transition of care needs. Anticipate pt will transition home with physician follow up when medially stable.   CM to continue to follow and assist.

## 2021-04-16 NOTE — CONSULTS
Phone: [phone]  Paging : [phone]      Hematology / Oncology Progress Consult Note      Assessment:     Ananth Lee is a 48 y.o., WHITE, male, with no significant past medical history who presented to the hospital on 4/11/21 with significant LLQ abdominal pain. I have been asked to see for a large cecal mass suspicious for colon cancer. Principal Problem:    Right sided abdominal pain (4/12/2021)    Active Problems:    Colonic mass (4/12/2021)      Tobacco use (4/12/2021)      Alcohol use (4/12/2021)      Large Cecal mass suspicious for colon cancer: colonoscopy on 4/12/21 that revealed a 9mm sessile polyp, a 13mm semipedunculated polyp, and a very large fungating/friable mass in the ileo cecal valve occupying most of the cecum. Cecal biopsy was consistent with at-least in situ carcinoma. Per comments, likely superficial sampling with underlying invasive carcinoma. Hep B/C neg. CEA 85.9. Plan for urgent surgical right colectomy today, 4/16/21. Plan:     - CT Chest with contrast to complete staging  - Follow up pathology   - Will send tumor sample for MSI testing and molecular studies  - Will schedule follow up to see me in clinic in 2 weeks    Will continue to follow. Please call with questions. Yaneth Myers MD  [address] [facility] [phone]      Subjective:      There were no acute overnight events  This am, he is going to the OR for a robotic right colectomy  Ongoing LLQ pain, NPO  Denies f/c/n/v      Current Facility-Administered Medications   Medication Dose Route Frequency    0.9% sodium chloride infusion 500 mL  500 mL IntraVENous CONTINUOUS    0.9% sodium chloride infusion  125 mL/hr IntraVENous CONTINUOUS    sodium chloride irrigation 0.9 % 1,000 mL Irrigation    PRN    bupivacaine (PF) 0.25 % (2.5 mg/mL) 30 mL, bupivacaine liposome (PF) susp 20 mL in 0.9% sodium chloride 50 mL iv solution    PRN    0.9% sodium chloride infusion  125 mL/hr IntraVENous CONTINUOUS    famotidine (PF) (PEPCID) 20 mg in 0.9% sodium chloride 10 mL injection  20 mg IntraVENous Q12H    morphine injection 4 mg  4 mg IntraVENous Q3H PRN    sodium chloride (NS) flush 5-40 mL  5-40 mL IntraVENous Q8H    sodium chloride (NS) flush 5-40 mL  5-40 mL IntraVENous PRN    acetaminophen (TYLENOL) tablet 650 mg  650 mg Oral Q6H PRN    Or    acetaminophen (TYLENOL) suppository 650 mg  650 mg Rectal Q6H PRN    polyethylene glycol (MIRALAX) packet 17 g  17 g Oral DAILY PRN    promethazine (PHENERGAN) tablet 12.5 mg  12.5 mg Oral Q6H PRN    Or    ondansetron (ZOFRAN) injection 4 mg  4 mg IntraVENous Q6H PRN    enoxaparin (LOVENOX) injection 40 mg  40 mg SubCUTAneous DAILY     Facility-Administered Medications Ordered in Other Encounters   Medication Dose Route Frequency    fentaNYL citrate (PF) injection   IntraVENous PRN    lidocaine (PF) (XYLOCAINE) 20 mg/mL (2 %) injection   IntraVENous PRN    propofoL (DIPRIVAN) 10 mg/mL injection   IntraVENous PRN    rocuronium injection   IntraVENous PRN    dexamethasone (DECADRON) 4 mg/mL injection    PRN    ondansetron (ZOFRAN) injection    PRN    ketamine (KETALAR) 10 mg/mL injection   IntraVENous PRN    dexmedeTOMidine (PRECEDEX) 100 mcg/mL iv solution   IntraVENous PRN    HYDROmorphone (PF) (DILAUDID) injection   IntraVENous PRN       Prior to Admission medications    Medication Sig Start Date End Date Taking? Authorizing Provider   famotidine (Pepcid AC) 20 mg tablet Take 20 mg by mouth daily.    Yes Provider, Historical       No Known Allergies    ECOG PS: 0    Physical Exam:    Temp (24hrs), Av.1 °F (36.7 °C), Min:97.6 °F (36.4 °C), Max:98.7 °F (37.1 °C)  VSIP    Intake/Output Summary (Last 24 hours) at 2021 0835  Last data filed at 2021 0744  Gross per 24 hour   Intake 1794 ml   Output    Net 1794 ml   RESULTRCNT(24h)Ct Abd Pelv W Cont    General: Alert , Oriented, in no distress  HEENT: no pallor, anicteric sclera, oral pharynx without lesion   No cervical, supraclavicular, axillary and inguinal lymphadenopathy palpated  Heart: regular rate, and rhythm, without murmur, gallop or rubbing  Lungs:breathing comfortably on room air, clear to auscultation and percussion bilaterally  ABD: bowel sound present, soft, nondistended, some TTP in LLQ, no hepatosplenomegaly or mass  Extremities: warm, well perfused, no edema  MSK: no tenderness along the spine or long bones  Skin: No rash  Neuro: non-focal      Recent Results (from the past 24 hour(s))   TYPE & SCREEN    Collection Time: 04/16/21  7:40 AM   Result Value Ref Range    Crossmatch Expiration 04/19/2021,2359     ABO/Rh(D) PENDING     Antibody screen PENDING      4/12/21 CT A/P  IMPRESSION     Lobular circumferential thickening of the ascending colon/cecum. Suspect a  neoplastic process.     Fatty infiltration of the liver.

## 2021-04-16 NOTE — ROUTINE PROCESS
TRANSFER - IN REPORT: 
 
Verbal report received from [facility] on [employer]  being received from PACU for routine post - op Report consisted of patients Situation, Background, Assessment and  
Recommendations(SBAR). Information from the following report(s) SBAR, Kardex, OR Summary and MAR was reviewed with the receiving nurse. Opportunity for questions and clarification was provided. Assessment completed upon patients arrival to unit and care assumed.

## 2021-04-17 ENCOUNTER — APPOINTMENT (OUTPATIENT)
Dept: CT IMAGING | Age: 51
DRG: 330 | End: 2021-04-17
Attending: SURGERY
Payer: OTHER GOVERNMENT

## 2021-04-17 VITALS
OXYGEN SATURATION: 98 % | WEIGHT: 188.5 LBS | HEART RATE: 84 BPM | RESPIRATION RATE: 16 BRPM | SYSTOLIC BLOOD PRESSURE: 166 MMHG | DIASTOLIC BLOOD PRESSURE: 85 MMHG | BODY MASS INDEX: 26.99 KG/M2 | HEIGHT: 70 IN | TEMPERATURE: 98.2 F

## 2021-04-17 PROBLEM — C18.9 CARCINOMA OF COLON (HCC): Status: ACTIVE | Noted: 2021-04-17

## 2021-04-17 LAB
ALBUMIN SERPL-MCNC: 3.1 G/DL (ref 3.4–5)
ALBUMIN/GLOB SERPL: 1.1 {RATIO} (ref 0.8–1.7)
ALP SERPL-CCNC: 42 U/L (ref 45–117)
ALT SERPL-CCNC: 45 U/L (ref 16–61)
ANION GAP SERPL CALC-SCNC: 5 MMOL/L (ref 3–18)
AST SERPL-CCNC: 35 U/L (ref 10–38)
BASOPHILS # BLD: 0 K/UL (ref 0–0.1)
BASOPHILS NFR BLD: 0 % (ref 0–2)
BILIRUB SERPL-MCNC: 0.3 MG/DL (ref 0.2–1)
BUN SERPL-MCNC: 7 MG/DL (ref 7–18)
BUN/CREAT SERPL: 9 (ref 12–20)
CALCIUM SERPL-MCNC: 8 MG/DL (ref 8.5–10.1)
CHLORIDE SERPL-SCNC: 109 MMOL/L (ref 100–111)
CO2 SERPL-SCNC: 26 MMOL/L (ref 21–32)
CREAT SERPL-MCNC: 0.82 MG/DL (ref 0.6–1.3)
DIFFERENTIAL METHOD BLD: ABNORMAL
EOSINOPHIL # BLD: 0 K/UL (ref 0–0.4)
EOSINOPHIL NFR BLD: 0 % (ref 0–5)
ERYTHROCYTE [DISTWIDTH] IN BLOOD BY AUTOMATED COUNT: 12.1 % (ref 11.6–14.5)
GLOBULIN SER CALC-MCNC: 2.7 G/DL (ref 2–4)
GLUCOSE SERPL-MCNC: 118 MG/DL (ref 74–99)
HCT VFR BLD AUTO: 36.4 % (ref 36–48)
HGB BLD-MCNC: 11.8 G/DL (ref 13–16)
LYMPHOCYTES # BLD: 0.9 K/UL (ref 0.9–3.6)
LYMPHOCYTES NFR BLD: 10 % (ref 21–52)
MAGNESIUM SERPL-MCNC: 2.3 MG/DL (ref 1.6–2.6)
MCH RBC QN AUTO: 30.3 PG (ref 24–34)
MCHC RBC AUTO-ENTMCNC: 32.4 G/DL (ref 31–37)
MCV RBC AUTO: 93.6 FL (ref 74–97)
MONOCYTES # BLD: 1.1 K/UL (ref 0.05–1.2)
MONOCYTES NFR BLD: 13 % (ref 3–10)
NEUTS SEG # BLD: 6.6 K/UL (ref 1.8–8)
NEUTS SEG NFR BLD: 77 % (ref 40–73)
PHOSPHATE SERPL-MCNC: 2 MG/DL (ref 2.5–4.9)
PLATELET # BLD AUTO: 245 K/UL (ref 135–420)
PMV BLD AUTO: 9.2 FL (ref 9.2–11.8)
POTASSIUM SERPL-SCNC: 3.7 MMOL/L (ref 3.5–5.5)
PROT SERPL-MCNC: 5.8 G/DL (ref 6.4–8.2)
RBC # BLD AUTO: 3.89 M/UL (ref 4.35–5.65)
SODIUM SERPL-SCNC: 140 MMOL/L (ref 136–145)
WBC # BLD AUTO: 8.5 K/UL (ref 4.6–13.2)

## 2021-04-17 PROCEDURE — 36415 COLL VENOUS BLD VENIPUNCTURE: CPT

## 2021-04-17 PROCEDURE — 74011250637 HC RX REV CODE- 250/637: Performed by: HOSPITALIST

## 2021-04-17 PROCEDURE — 74011250636 HC RX REV CODE- 250/636: Performed by: FAMILY MEDICINE

## 2021-04-17 PROCEDURE — 74011000636 HC RX REV CODE- 636: Performed by: FAMILY MEDICINE

## 2021-04-17 PROCEDURE — 80053 COMPREHEN METABOLIC PANEL: CPT

## 2021-04-17 PROCEDURE — 74011250636 HC RX REV CODE- 250/636: Performed by: SURGERY

## 2021-04-17 PROCEDURE — 85025 COMPLETE CBC W/AUTO DIFF WBC: CPT

## 2021-04-17 PROCEDURE — 74011250636 HC RX REV CODE- 250/636: Performed by: HOSPITALIST

## 2021-04-17 PROCEDURE — 84100 ASSAY OF PHOSPHORUS: CPT

## 2021-04-17 PROCEDURE — 2709999900 HC NON-CHARGEABLE SUPPLY

## 2021-04-17 PROCEDURE — 83735 ASSAY OF MAGNESIUM: CPT

## 2021-04-17 PROCEDURE — 71260 CT THORAX DX C+: CPT

## 2021-04-17 PROCEDURE — 74011250637 HC RX REV CODE- 250/637: Performed by: SURGERY

## 2021-04-17 PROCEDURE — 97161 PT EVAL LOW COMPLEX 20 MIN: CPT

## 2021-04-17 PROCEDURE — 74011000250 HC RX REV CODE- 250: Performed by: FAMILY MEDICINE

## 2021-04-17 PROCEDURE — 74011000250 HC RX REV CODE- 250: Performed by: SURGERY

## 2021-04-17 RX ORDER — HYDRALAZINE HYDROCHLORIDE 20 MG/ML
10 INJECTION INTRAMUSCULAR; INTRAVENOUS
Status: DISCONTINUED | OUTPATIENT
Start: 2021-04-17 | End: 2021-04-17 | Stop reason: HOSPADM

## 2021-04-17 RX ORDER — AMLODIPINE BESYLATE 5 MG/1
2.5 TABLET ORAL ONCE
Status: COMPLETED | OUTPATIENT
Start: 2021-04-17 | End: 2021-04-17

## 2021-04-17 RX ORDER — ENOXAPARIN SODIUM 100 MG/ML
40 INJECTION SUBCUTANEOUS DAILY
Qty: 30 SYRINGE | Refills: 0 | Status: ON HOLD | OUTPATIENT
Start: 2021-04-17 | End: 2022-01-12

## 2021-04-17 RX ORDER — AMLODIPINE BESYLATE 5 MG/1
2.5 TABLET ORAL DAILY
Status: DISCONTINUED | OUTPATIENT
Start: 2021-04-17 | End: 2021-04-17

## 2021-04-17 RX ORDER — GABAPENTIN 100 MG/1
100 CAPSULE ORAL 3 TIMES DAILY
Qty: 30 CAP | Refills: 0 | Status: SHIPPED | OUTPATIENT
Start: 2021-04-17 | End: 2021-04-27

## 2021-04-17 RX ORDER — AMLODIPINE BESYLATE 5 MG/1
5 TABLET ORAL DAILY
Status: DISCONTINUED | OUTPATIENT
Start: 2021-04-18 | End: 2021-04-17 | Stop reason: HOSPADM

## 2021-04-17 RX ORDER — AMLODIPINE BESYLATE 5 MG/1
5 TABLET ORAL DAILY
Qty: 30 TAB | Refills: 0 | Status: SHIPPED | OUTPATIENT
Start: 2021-04-18

## 2021-04-17 RX ADMIN — ENOXAPARIN SODIUM 40 MG: 40 INJECTION SUBCUTANEOUS at 08:44

## 2021-04-17 RX ADMIN — KETOROLAC TROMETHAMINE 30 MG: 30 INJECTION, SOLUTION INTRAMUSCULAR at 06:36

## 2021-04-17 RX ADMIN — KETOROLAC TROMETHAMINE 30 MG: 30 INJECTION, SOLUTION INTRAMUSCULAR at 17:17

## 2021-04-17 RX ADMIN — AMLODIPINE BESYLATE 2.5 MG: 5 TABLET ORAL at 08:44

## 2021-04-17 RX ADMIN — AMLODIPINE BESYLATE 2.5 MG: 5 TABLET ORAL at 13:56

## 2021-04-17 RX ADMIN — FAMOTIDINE 20 MG: 10 INJECTION INTRAVENOUS at 08:45

## 2021-04-17 RX ADMIN — ACETAMINOPHEN 1000 MG: 500 TABLET ORAL at 15:58

## 2021-04-17 RX ADMIN — Medication 10 ML: at 06:37

## 2021-04-17 RX ADMIN — HYDRALAZINE HYDROCHLORIDE 10 MG: 20 INJECTION INTRAMUSCULAR; INTRAVENOUS at 15:58

## 2021-04-17 RX ADMIN — GABAPENTIN 100 MG: 100 CAPSULE ORAL at 15:58

## 2021-04-17 RX ADMIN — Medication 10 ML: at 06:36

## 2021-04-17 RX ADMIN — POTASSIUM PHOSPHATE, MONOBASIC AND POTASSIUM PHOSPHATE, DIBASIC: 224; 236 INJECTION, SOLUTION, CONCENTRATE INTRAVENOUS at 13:20

## 2021-04-17 RX ADMIN — KETOROLAC TROMETHAMINE 30 MG: 30 INJECTION, SOLUTION INTRAMUSCULAR at 13:10

## 2021-04-17 RX ADMIN — IOPAMIDOL 100 ML: 612 INJECTION, SOLUTION INTRAVENOUS at 16:23

## 2021-04-17 RX ADMIN — ACETAMINOPHEN 1000 MG: 500 TABLET ORAL at 08:46

## 2021-04-17 RX ADMIN — GABAPENTIN 100 MG: 100 CAPSULE ORAL at 08:46

## 2021-04-17 RX ADMIN — DEXTROSE MONOHYDRATE, SODIUM CHLORIDE, AND POTASSIUM CHLORIDE 100 ML/HR: 50; 4.5; 1.49 INJECTION, SOLUTION INTRAVENOUS at 03:34

## 2021-04-17 NOTE — PROGRESS NOTES
Hospitalist Progress Note-critical care note     Patient: Eddie Hill MRN: [id number]  CSN: [id number]    YOB: 1970  Age: 48 y.o. Sex: male    DOA: 4/11/2021 LOS:  LOS: 5 days            Chief complaint: Carcinoma of colon,     Assessment/Plan         Hospital Problems  Date Reviewed: 4/16/2021          Codes Class Noted POA    Carcinoma of colon ([address] ICD-10-CM: C18.9  ICD-9-CM: 153.9  4/17/2021 Unknown        Hypertension ICD-10-CM: I10  ICD-9-CM: 401.9  Unknown Unknown        * (Principal) Right sided abdominal pain ICD-10-CM: R10.9  ICD-9-CM: 789.09  4/12/2021 Yes        Colonic mass ICD-10-CM: K63.89  ICD-9-CM: 569.89  4/12/2021 Yes        Tobacco use ICD-10-CM: Z72.0  ICD-9-CM: 305.1  4/12/2021 Yes        Alcohol use ICD-10-CM: Z72.89  ICD-9-CM: V49.89  4/12/2021 Yes                   14-year-old male with chewing tobacco use is admitted for mass in the ascending colon with intractable abdominal pain.     Carcinoma of the colon  S/p right colectomy yesterday, tolerated very well  Case discussed with Dr. Estrella Salazar oncologist  Continue pain control     Hypertension  Norvasc added. Continue pain control hydralazine as needed      GERD  pepcid IV    Alcohol use, so far no withdrawal    Tobacco use  Continue education       Subjective: Pain is okay, passing gas. May I go home today? Disposition : when surgeon approved  Review of systems:    General: No fevers or chills. Cardiovascular: No chest pain or pressure. No palpitations. Pulmonary: No shortness of breath.    Gastrointestinal: No nausea, vomiting.+ abdomen pain      Vital signs/Intake and Output:  Visit Vitals  BP (!) 181/98   Pulse 78   Temp 98.1 °F (36.7 °C)   Resp 17   Ht 5' 10\" (1.778 m)   Wt 85.5 kg (188 lb 8 oz)   SpO2 99%   BMI 27.05 kg/m²     Current Shift:  04/17 0701 - 04/17 1900  In: 786 [I.V.:432]  Out: 375 [Urine:375]  Last three shifts:  04/15 1901 - 04/17 0700  In: 0152 [P.O.:660; I.V.:2600]  Out: 4158 [VBXOL:7464]    Physical Exam:  General: WD, WN. Alert, cooperative, no acute distress    HEENT: NC, Atraumatic. PERRLA, anicteric sclerae. Lungs: CTA Bilaterally. No Wheezing/Rhonchi/Rales. Heart:  Regular  rhythm,  No murmur, No Rubs, No Gallops  Abdomen: Soft, Non distended, Non tender. +Bowel sounds, surgical site clean   Extremities: No c/c/e  Psych:   Not anxious or agitated. Neurologic:  No acute neurological deficit. Labs: Results:       Chemistry Recent Labs     04/17/21  0200 04/15/21  0200   * 90    140   K 3.7 3.5    105   CO2 26 33*   BUN 7 8   CREA 0.82 0.82   CA 8.0* 8.0*   AGAP 5 2*   BUCR 9* 10*   AP 42* 39*   TP 5.8* 5.5*   ALB 3.1* 3.0*   GLOB 2.7 2.5   AGRAT 1.1 1.2      CBC w/Diff Recent Labs     04/17/21  0200 04/15/21  0200   WBC 8.5 4.7   RBC 3.89* 3.68*   HGB 11.8* 11.2*   HCT 36.4 34.9*    179   GRANS 77*  --    LYMPH 10*  --    EOS 0  --       Cardiac Enzymes No results for input(s): CPK, CKND1, AZUCENA in the last 72 hours. No lab exists for component: CKRMB, TROIP   Coagulation No results for input(s): PTP, INR, APTT, INREXT, INREXT in the last 72 hours. Lipid Panel No results found for: CHOL, CHOLPOCT, CHOLX, CHLST, CHOLV, 423035, HDL, HDLP, LDL, LDLC, DLDLP, 121739, VLDLC, VLDL, TGLX, TRIGL, TRIGP, TGLPOCT, CHHD, CHHDX   BNP No results for input(s): BNPP in the last 72 hours. Liver Enzymes Recent Labs     04/17/21 0200   TP 5.8*   ALB 3.1*   AP 42*      Thyroid Studies No results found for: T4, T3U, TSH, TSHEXT, TSHEXT     Procedures/imaging: see electronic medical records for all procedures/Xrays and details which were not copied into this note but were reviewed prior to creation of Plan    Ct Abd Pelv W Cont    Result Date: 4/12/2021  EXAM: CT of the abdomen and pelvis INDICATION: Pain. COMPARISON: None. TECHNIQUE: Axial CT imaging of the abdomen and pelvis was performed with intravenous contrast. Multiplanar reformats were generated. Dose reduction techniques used: automated exposure control, adjustment of the mAs and/or kVp according to patient size, and iterative reconstruction techniques. Digital imaging and communications in Medicine (DICOM) format image data are available to nonaffiliated external healthcare facilities or entities on a secure, media free, reciprocally searchable basis with patient authorization for at least 12 months after this study. _______________ FINDINGS: LOWER CHEST: Unremarkable. LIVER, BILIARY: The liver is of diminished attenuation. No biliary dilation. Gallbladder is unremarkable. PANCREAS: Normal. SPLEEN: Normal. ADRENALS: Normal. KIDNEYS: There are a few subcentimeter hypodensities within the left kidney to small to characterize but likely small cysts. LYMPH NODES: No enlarged lymph nodes. GASTROINTESTINAL TRACT: No bowel dilation or wall thickening. There is lobular circumferential thickening of the cecum/ascending colon. PELVIC ORGANS: Unremarkable. VASCULATURE: Unremarkable. BONES: No acute or aggressive osseous abnormalities identified. OTHER: None. _______________     Lobular circumferential thickening of the ascending colon/cecum. Suspect a neoplastic process. Fatty infiltration of the liver.        Rebekah Zamarripa MD

## 2021-04-17 NOTE — PROGRESS NOTES
Phone: [phone]  Paging : [phone]      Hematology / Oncology Progress Note    Admit Date: 4/11/2021    Assessment:     · Hem/Onc Issues: Colon cancer, s/p hemicolectomy, path pending  · Reasons for Admission: Abd pain/colon mass  · Other Active Issues:      Principal Problem:    Right sided abdominal pain (4/12/2021)    Active Problems:    Colonic mass (4/12/2021)      Tobacco use (4/12/2021)      Alcohol use (4/12/2021)      Carcinoma of colon ([facility] [address] (4/17/2021)      Hypertension ()      GERD (gastroesophageal reflux disease) ()        Plan:     · Continue post op recover  · Follow up with Dr. Virgen Stearns as out patient to review final path and decide if adjuvant chemo is indicated. · Other management per primary team and Dr. Catherine Moreno team      Subjectives: Had surgery yesterday; slowly recovering. No N/V, no abd pain    Objectives:      VITAL SIGNS:   Patient Vitals for the past 24 hrs:   BP Temp Pulse Resp SpO2   04/17/21 1143 (!) 181/98 98.1 °F (36.7 °C) 78 17 99 %   04/17/21 0801 (!) 171/96 98.1 °F (36.7 °C) 73 17 99 %   04/17/21 0354 (!) 168/86 98.4 °F (36.9 °C) 71 16 100 %   04/17/21 0037 (!) 149/81 98.5 °F (36.9 °C) 81 16 99 %   04/16/21 1915 (!) 169/89 98.6 °F (37 °C) 88 16 98 %   04/16/21 1806 (!) 152/98 97.4 °F (36.3 °C) 84 16 97 %   04/16/21 1511 (!) 153/93 99.2 °F (37.3 °C) 80 14 98 %     GENERAL: normal appearance, no acute distress. HEENT: conjunctivae normal, eyelids normal, PERRLA, anicteric, external ears and nose normal, hearing grossly normal.   NECK: supple, no masses. LUNG: breathing comfortably, lungs clear to auscultation and percussion. CARDIOVASCULAR: normal heart sounds, heart rhythm regular, no peripheral edema. ABDOMEN: Incision intact   PELVIS: pelvic exam deferred. RECTUM: rectal exam deferred. LYMPH NODES: no cervical adenopathy, no axillary adenopathy, no supraclavicular adenopathy, no infraclavicular adenopathy.    SKIN: no rash, no petechiae, no ecchymosis, no pallor, no cutaneous nodules. MUSCULOSKELETAL: normal muscle strength. NEUROLOGIC: no focal motor deficit, normal gait, no abnormal mental status. PSYCHIATRIC: oriented to person, time and place, mood and affect appropriate.       Medications:      Current Medications:    Current Facility-Administered Medications   Medication Dose Route Frequency    potassium phosphate 20 mmol in 0.9% sodium chloride 500 mL infusion   IntraVENous ONCE    hydrALAZINE (APRESOLINE) 20 mg/mL injection 10 mg  10 mg IntraVENous Q6H PRN    [START ON 4/18/2021] amLODIPine (NORVASC) tablet 5 mg  5 mg Oral DAILY    sodium chloride (NS) flush 5-40 mL  5-40 mL IntraVENous Q8H    sodium chloride (NS) flush 5-40 mL  5-40 mL IntraVENous PRN    enoxaparin (LOVENOX) injection 40 mg  40 mg SubCUTAneous DAILY    magnesium sulfate 2 g/50 ml IVPB (premix or compounded)  2 g IntraVENous PRN    gabapentin (NEURONTIN) capsule 100 mg  100 mg Oral TID    acetaminophen (TYLENOL) tablet 1,000 mg  1,000 mg Oral TID    ketorolac (TORADOL) injection 30 mg  30 mg IntraVENous Q6H    famotidine (PF) (PEPCID) 20 mg in 0.9% sodium chloride 10 mL injection  20 mg IntraVENous Q12H    sodium chloride (NS) flush 5-40 mL  5-40 mL IntraVENous Q8H    sodium chloride (NS) flush 5-40 mL  5-40 mL IntraVENous PRN    promethazine (PHENERGAN) tablet 12.5 mg  12.5 mg Oral Q6H PRN    Or    ondansetron (ZOFRAN) injection 4 mg  4 mg IntraVENous Q6H PRN       Allergies:    No Known Allergies      Ancillary Study Results:      Laboratory:    Recent Results (from the past 24 hour(s))   METABOLIC PANEL, COMPREHENSIVE    Collection Time: 04/17/21  2:00 AM   Result Value Ref Range    Sodium 140 136 - 145 mmol/L    Potassium 3.7 3.5 - 5.5 mmol/L    Chloride 109 100 - 111 mmol/L    CO2 26 21 - 32 mmol/L    Anion gap 5 3.0 - 18 mmol/L    Glucose 118 (H) 74 - 99 mg/dL    BUN 7 7.0 - 18 MG/DL    Creatinine 0.82 0.6 - 1.3 MG/DL    BUN/Creatinine ratio 9 (L) 12 - 20      GFR est AA >60 >60 ml/min/1.73m2    GFR est non-AA >60 >60 ml/min/1.73m2    Calcium 8.0 (L) 8.5 - 10.1 MG/DL    Bilirubin, total 0.3 0.2 - 1.0 MG/DL    ALT (SGPT) 45 16 - 61 U/L    AST (SGOT) 35 10 - 38 U/L    Alk. phosphatase 42 (L) 45 - 117 U/L    Protein, total 5.8 (L) 6.4 - 8.2 g/dL    Albumin 3.1 (L) 3.4 - 5.0 g/dL    Globulin 2.7 2.0 - 4.0 g/dL    A-G Ratio 1.1 0.8 - 1.7     CBC WITH AUTOMATED DIFF    Collection Time: 04/17/21  2:00 AM   Result Value Ref Range    WBC 8.5 4.6 - 13.2 K/uL    RBC 3.89 (L) 4.35 - 5.65 M/uL    HGB 11.8 (L) 13.0 - 16.0 g/dL    HCT 36.4 36.0 - 48.0 %    MCV 93.6 74.0 - 97.0 FL    MCH 30.3 24.0 - 34.0 PG    MCHC 32.4 31.0 - 37.0 g/dL    RDW 12.1 11.6 - 14.5 %    PLATELET 476 190 - 808 K/uL    MPV 9.2 9.2 - 11.8 FL    NEUTROPHILS 77 (H) 40 - 73 %    LYMPHOCYTES 10 (L) 21 - 52 %    MONOCYTES 13 (H) 3 - 10 %    EOSINOPHILS 0 0 - 5 %    BASOPHILS 0 0 - 2 %    ABS. NEUTROPHILS 6.6 1.8 - 8.0 K/UL    ABS. LYMPHOCYTES 0.9 0.9 - 3.6 K/UL    ABS. MONOCYTES 1.1 0.05 - 1.2 K/UL    ABS. EOSINOPHILS 0.0 0.0 - 0.4 K/UL    ABS. BASOPHILS 0.0 0.0 - 0.1 K/UL    DF AUTOMATED     PHOSPHORUS    Collection Time: 04/17/21  2:00 AM   Result Value Ref Range    Phosphorus 2.0 (L) 2.5 - 4.9 MG/DL   MAGNESIUM    Collection Time: 04/17/21  2:00 AM   Result Value Ref Range    Magnesium 2.3 1.6 - 2.6 mg/dL         Radiology:   Ct Abd Pelv W Cont    Result Date: 4/12/2021  EXAM: CT of the abdomen and pelvis INDICATION: Pain. COMPARISON: None. TECHNIQUE: Axial CT imaging of the abdomen and pelvis was performed with intravenous contrast. Multiplanar reformats were generated. Dose reduction techniques used: automated exposure control, adjustment of the mAs and/or kVp according to patient size, and iterative reconstruction techniques.  Digital imaging and communications in Medicine (DICOM) format image data are available to nonaffiliated external healthcare facilities or entities on a secure, media free, reciprocally searchable basis with patient authorization for at least 12 months after this study. _______________ FINDINGS: LOWER CHEST: Unremarkable. LIVER, BILIARY: The liver is of diminished attenuation. No biliary dilation. Gallbladder is unremarkable. PANCREAS: Normal. SPLEEN: Normal. ADRENALS: Normal. KIDNEYS: There are a few subcentimeter hypodensities within the left kidney to small to characterize but likely small cysts. LYMPH NODES: No enlarged lymph nodes. GASTROINTESTINAL TRACT: No bowel dilation or wall thickening. There is lobular circumferential thickening of the cecum/ascending colon. PELVIC ORGANS: Unremarkable. VASCULATURE: Unremarkable. BONES: No acute or aggressive osseous abnormalities identified. OTHER: None. _______________     Lobular circumferential thickening of the ascending colon/cecum. Suspect a neoplastic process. Fatty infiltration of the liver. Antonio Rodas.  Seda Juarez MD, PhD, [address]  Phone: [phone]  Pager: [phone]

## 2021-04-17 NOTE — PROGRESS NOTES
Summary -- Pt comfortable throughout shift. CC is abd pain that responds well to ambulating, ice, and scheduled medication regimen. Ambulates frequently; very engaged in [address]. Complaint with SCD, TCDB, IS, ambulation. Advanced to regular diet and is tolerating PO well. Passing flatus and reports two small loose stools overnight. Voiding without difficulty s/p espino removal.  Dressings CDI. Started on norvasc and prn hydralazine for elevated BP. No additional concerns/complaints noted. Daughter at bedside in morning and wife in afternoon. CIWA assessment performed; score 0. Patient Vitals for the past 12 hrs:   Temp Pulse Resp BP SpO2   04/17/21 1551 98 °F (36.7 °C) 83 16 (!) 163/88 100 %   04/17/21 1143 98.1 °F (36.7 °C) 78 17 (!) 181/98 99 %   04/17/21 0801 98.1 °F (36.7 °C) 73 17 (!) 171/96 99 %     Discharge instructions reviewed with the patient and spouse. Patient verbalized understanding and verified by teach back. All questions answered. Pending discharge home after IV potassium phosphate infusion completes.

## 2021-04-17 NOTE — BRIEF OP NOTE
Brief Postoperative Note    Patient: Stephanie Tripp  YOB: 1970  MRN: [id number]    Date of Procedure: 4/16/2021     Pre-Op Diagnosis: OBSTRUCTIING COLON CANCER, ELEVATED LIVER ENZYMES    Post-Op Diagnosis: Same as preoperative diagnosis.       Procedure(s):  ROBOTIC RIGHT COLECTOMY DAVINCI with LIVER WEDGE BIOPSY    Surgeon(s):  Zhou Patton DO    Surgical Assistant: Surg Asst-1: Koby Fierro    Anesthesia: General     Estimated Blood Loss (mL): Minimal    Complications: None    Specimens:   ID Type Source Tests Collected by Time Destination   1 : Liver wedge biopsy Preservative Liver  Zhou Patton DO 4/16/2021 1204 Pathology   2 : Right Colon Preservative Colon, Right  Zhou Patton DO 4/16/2021 1220 Pathology        Implants: * No implants in log *    Drains:   [REMOVED] Orogastric Tube 04/16/21 (Removed)       Findings: Large ascending tumor, no other gross metastasis    Electronically Signed by Becky Ann DO on 4/17/2021 at 5:01 PM

## 2021-04-17 NOTE — PROGRESS NOTES
Problem: Mobility Impaired (Adult and Pediatric)  Goal: *Acute Goals and Plan of Care (Insert Text)  Note:   physical Therapy EVALUATION & Discharge    Patient: Alyssa Abernathy [de-identified]48 y.o. male)  Date: 4/17/2021  Primary Diagnosis: Right sided abdominal pain [R10.9]  Colonic mass [K63.89]  Intractable abdominal pain [R10.9]  Procedure(s) (LRB):  ROBOTIC RIGHT COLECTOMY DAVINCI with LIVER WEDGE BIOPSY (N/A) 1 Day Post-Op   Precautions:  Fall    ASSESSMENT AND RECOMMENDATIONS:  Based on the objective data described below, the patient presents with good strength bilaterally, Independent with all mobility, Amb 300 ft s/AD and independently managing IV pole. No LOB or unsteadiness noted during session. Reviewed activity recommendations. Skilled physical therapy is not indicated at this time. Discharge Recommendations: None  Further Equipment Recommendations for Discharge: None      SUBJECTIVE:   Patient stated I have been walking.     OBJECTIVE DATA SUMMARY:     Past Medical History:   Diagnosis Date    GERD (gastroesophageal reflux disease)     Hypertension      Past Surgical History:   Procedure Laterality Date    COLONOSCOPY N/A 4/12/2021    COLONOSCOPY; BIOPSY performed by Mendel Peabody, MD at [facility] ENDOSCOPY    HX TONSILLECTOMY       Barriers to Learning/Limitations: None  Compensate with: visual, verbal, tactile, kinesthetic cues/model  Prior Level of Function/Home Situation: Independent amb s/AD  Home Situation  Home Environment: Private residence([facility])  # Steps to Enter: 0  One/Two Story Residence: Other (Comment)(3 story [facility])  Living Alone: No  Support Systems: Spouse/Significant Other/Partner  Patient Expects to be Discharged to[de-identified] Private residence  Current DME Used/Available at Home: None  Critical Behavior:  Neurologic State: Alert  Psychosocial  Purposeful Interaction: Yes  Pt Identified Daily Priority: Clinical issues (comment)(s/p bowel surgery, ERAS)  Caritas Process: Nurture loving kindness;Establish trust  Caring Interventions: Reassure  Reassure: Therapeutic listening; Informing  Skin Condition/Temp: Dry;Warm   Skin Integrity: Incision (comment)(midline abd, lap sites)  Skin Integumentary  Skin Color: Appropriate for ethnicity  Skin Condition/Temp: Dry;Warm  Skin Integrity: Incision (comment)(midline abd, lap sites)  Turgor: Non-tenting   Strength:    Strength: Within functional limits  Range Of Motion:  AROM: Within functional limits  PROM: Within functional limits  Functional Mobility:  Bed Mobility:   Supine to Sit: Independent  Sit to Supine: Independent   Transfers:  Sit to Stand: Independent  Stand to Sit: Independent  Balance:   Sitting: Intact  Standing: Intact; Without support  Ambulation/Gait Training:  Distance (ft): 300 Feet (ft)  Assistive Device: Gait belt(IV pole)  Ambulation - Level of Assistance: Independent   Gait Description (WDL): Exceptions to WDL  Pain:  Pain Scale 1: Numeric (0 - 10)  Pain Intensity 1: 3  Pain Location 1: Abdomen  Pain Orientation 1: Mid  Pain Description 1: Aching; Intermittent  Pain Intervention(s) 1: Medication (see MAR); Cold pack; Distraction; Emotional support  Activity Tolerance:   Good  Please refer to the flowsheet for vital signs taken during this treatment. After treatment:   [x]         Patient left in no apparent distress sitting up in chair  []         Patient left in no apparent distress in bed  [x]         Call bell left within reach  [x]         Nursing notified  []         Caregiver present  []         Bed alarm activated    COMMUNICATION/EDUCATION:   [x]         Fall prevention education was provided and the patient/caregiver indicated understanding. [x]         Patient/family have participated as able in goal setting and plan of care. [x]         Patient/family agree to work toward stated goals and plan of care. []         Patient understands intent and goals of therapy, but is neutral about his/her participation.   []         Patient is unable to participate in goal setting and plan of care.     Thank you for this referral.  Abhi Becker   Time Calculation: 8 mins   Eval Complexity: History: MEDIUM  Complexity : 1-2 comorbidities / personal factors will impact the outcome/ POC Exam:LOW Complexity : 1-2 Standardized tests and measures addressing body structure, function, activity limitation and / or participation in recreation  Presentation: LOW Complexity : Stable, uncomplicated  Clinical Decision Making:Low Complexity    Overall Complexity:LOW

## 2021-04-17 NOTE — ROUTINE PROCESS
Bedside shift change report given to Freddie Cochran RN (oncoming nurse) by Saima Ortiz RN (offgoing nurse). Report included the following information SBAR, Kardex, ED Summary, OR Summary, Intake/Output, MAR, Recent Results and Med Rec Status.

## 2021-04-17 NOTE — ROUTINE PROCESS
Shift change report given to Kala So RN (oncoming nurse) by Olivia Whaley RN  (offgoing nurse). Report included the following information SBAR, MAR and Kardex.

## 2021-04-17 NOTE — DISCHARGE SUMMARY
Discharge Summary    Patient: Alise Goddard MRN: [id number]  CSN: [id number]    YOB: 1970  Age: 48 y.o. Sex: male    DOA: 4/11/2021 LOS:  LOS: 5 days   Discharge Date:      Primary Care Provider:  None    Admission Diagnoses: Right sided abdominal pain [R10.9]  Colonic mass [K63.89]  Intractable abdominal pain [R10.9]    Discharge Diagnoses:    Hospital Problems  Date Reviewed: 4/16/2021          Codes Class Noted POA    Carcinoma of colon ([facility] [address] ICD-10-CM: C18.9  ICD-9-CM: 153.9  4/17/2021 Unknown        Hypertension ICD-10-CM: I10  ICD-9-CM: 401.9  Unknown Unknown        GERD (gastroesophageal reflux disease) ICD-10-CM: K21.9  ICD-9-CM: 530.81  Unknown Unknown        * (Principal) Right sided abdominal pain ICD-10-CM: R10.9  ICD-9-CM: 789.09  4/12/2021 Yes        Colonic mass ICD-10-CM: K63.89  ICD-9-CM: 569.89  4/12/2021 Yes        Tobacco use ICD-10-CM: Z72.0  ICD-9-CM: 305.1  4/12/2021 Yes        Alcohol use ICD-10-CM: Z72.89  ICD-9-CM: V49.89  4/12/2021 Yes              Discharge Condition: stable     Discharge Medications:     Current Discharge Medication List      START taking these medications    Details   enoxaparin (Lovenox) 40 mg/0.4 mL 0.4 mL by SubCUTAneous route daily. Qty: 30 Syringe, Refills: 0      amLODIPine (NORVASC) 5 mg tablet Take 1 Tab by mouth daily. Qty: 30 Tab, Refills: 0      gabapentin (NEURONTIN) 100 mg capsule Take 1 Cap by mouth three (3) times daily for 10 days. Max Daily Amount: 300 mg. Indications: acute pain following an operation  Qty: 30 Cap, Refills: 0    Associated Diagnoses: Status post colectomy         CONTINUE these medications which have NOT CHANGED    Details   famotidine (Pepcid AC) 20 mg tablet Take 20 mg by mouth daily.              Procedures :   ROBOTIC RIGHT COLECTOMY [place] with LIVER WEDGE BIOPSY    Consults: Gastroenterology, General Surgery and Hematology/Oncology      PHYSICAL EXAM   Visit Vitals  BP (!) 163/88 (BP 1 Location: Right arm, BP Patient Position: At rest)   Pulse 83   Temp 98 °F (36.7 °C)   Resp 16   Ht 5' 10\" (1.778 m)   Wt 85.5 kg (188 lb 8 oz)   SpO2 100%   BMI 27.05 kg/m²     General: Awake, cooperative, no acute distress    HEENT: NC, Atraumatic. PERRLA, EOMI. Anicteric sclerae. Lungs:  CTA Bilaterally. No Wheezing/Rhonchi/Rales. Heart:  Regular  rhythm,  No murmur, No Rubs, No Gallops  Abdomen: Soft, Non distended, Non tender. +Bowel sounds,   Extremities: No c/c/e, surgical site noted   Psych:   Not anxious or agitated. Neurologic:  No acute neurological deficits. Admission HPI :   Ruby Spears is a 48 y.o. male who presents to the ED with his wife after acute onset of abdominal pain this evening. He has not had similar episodes prior to this. He does have significant for cancer in his family history with an uncle who has had colon cancer. He has never had a colonoscopy. He endorses a change in bowel habits with softer stool starting about 6 months ago. No weight loss, change in appetite, fever, or cough. Hospital Course :   Patient was admitted due to colon mass, GI was on board colonoscopy was performed. Biopsy was done indicated carcinoma. Oncologist was consulted. Recommended outpatient follow-up. General surgery was consulted, ROBOTIC RIGHT COLECTOMY [address] with LIVER WEDGE BIOPSY was performed. He tolerated procedure very well. He got a bowel movement before discharge, tolerated diet very well. Before discharge, CT scan results reviewed with Dr. Mike Medel. She agree to discharge patient home. Ct scan results talked with patient's wife also. Discharge planning discussed with patient, pt agrees  with the plan and no questions and concerns at this point.        Activity: Activity as tolerated    Diet: Regular Diet    Follow-up: PCP and Dr. Mike Medel and oncologist     Disposition: home     Minutes spent on discharge: 45 min       Labs: Results:       Chemistry Recent Labs 04/17/21  0200 04/15/21  0200   * 90    140   K 3.7 3.5    105   CO2 26 33*   BUN 7 8   CREA 0.82 0.82   CA 8.0* 8.0*   AGAP 5 2*   BUCR 9* 10*   AP 42* 39*   TP 5.8* 5.5*   ALB 3.1* 3.0*   GLOB 2.7 2.5   AGRAT 1.1 1.2      CBC w/Diff Recent Labs     04/17/21  0200 04/15/21  0200   WBC 8.5 4.7   RBC 3.89* 3.68*   HGB 11.8* 11.2*   HCT 36.4 34.9*    179   GRANS 77*  --    LYMPH 10*  --    EOS 0  --       Cardiac Enzymes No results for input(s): CPK, CKND1, AZUCENA in the last 72 hours. No lab exists for component: CKRMB, TROIP   Coagulation No results for input(s): PTP, INR, APTT, INREXT in the last 72 hours. Lipid Panel No results found for: CHOL, CHOLPOCT, CHOLX, CHLST, CHOLV, 152911, HDL, HDLP, LDL, LDLC, DLDLP, 409341, VLDLC, VLDL, TGLX, TRIGL, TRIGP, TGLPOCT, CHHD, CHHDX   BNP No results for input(s): BNPP in the last 72 hours. Liver Enzymes Recent Labs     04/17/21  0200   TP 5.8*   ALB 3.1*   AP 42*      Thyroid Studies No results found for: T4, T3U, TSH, TSHEXT       @micro    Significant Diagnostic Studies: Ct Chest W Cont    Result Date: 4/17/2021  EXAM: CT chest INDICATION: Colon cancer rule out metastatic disease COMPARISON: None. TECHNIQUE: Axial CT imaging from the thoracic inlet through the diaphragm with intravenous contrast. Multiplanar reformats were generated. One or more dose reduction techniques were used on this CT: automated exposure control, adjustment of the mAs and/or kVp according to patient size, and iterative reconstruction techniques. The specific techniques used on this CT exam have been documented in the patient's electronic medical record. Digital Imaging and Communications in Medicine (DICOM) format image data are available to nonaffiliated external healthcare facilities or entities on a secure, media free, reciprocally searchable basis with patient authorization for at least a 12-month period after this study.  _______________ FINDINGS: THYROID: Visualized thyroid is unremarkable. LYMPH NODES: No enlarged lymph nodes seen. PLEURA: Minimal right pleural effusion seen. HEART: Unremarkable without pericardial effusion. VASCULATURE/MEDIASTINUM: Unremarkable. Small hiatal hernia present. LUNGS: There is a 2 mm nodule in the right upper lobe anteriorly on image 48. No abnormal opacities. AIRWAY: Patent UPPER ABDOMEN: Mild hepatic steatosis present. 3 mm low-density lesion seen in the posterior segment right hepatic lobe in image 88. There is another low-density lesion posterior segment right hepatic lobe on image 90 measuring 3 mm. There is a 1 cm low-density lesion anterior segment right hepatic lobe image 96. Given the history of colon cancer, metastasis is not completely excluded. Visualized gallbladder, pancreas, spleen, adrenals and visualized left kidney are unremarkable. There is a 2 to 3 mm calculus in midpole the right kidney. There are dilated small bowel loops in the upper abdomen suggesting postoperative ileus. Air-fluid levels are seen throughout the colon again suggesting ileus. Small amount of free air is seen secondary to recent hemicolectomy. Subcutaneous emphysema seen along the abdominal wall. OTHER: No acute or aggressive osseous abnormalities identified. _______________     There are postoperative changes from hemicolectomy. Small amount of free air is present in the abdomen secondary to surgery. There is extensive subcutaneous emphysema. There is postoperative ileus with dilated small bowel loops. 3 small low-attenuation areas are seen in the liver. Underlying metastasis is not excluded. 2 mm nodule in the right upper lobe. Follow-up as per oncology protocol. Nonobstructive calculus right kidney. Minimal right effusion. Ct Abd Pelv W Cont    Result Date: 4/12/2021  EXAM: CT of the abdomen and pelvis INDICATION: Pain. COMPARISON: None.  TECHNIQUE: Axial CT imaging of the abdomen and pelvis was performed with intravenous contrast. Multiplanar reformats were generated. Dose reduction techniques used: automated exposure control, adjustment of the mAs and/or kVp according to patient size, and iterative reconstruction techniques. Digital imaging and communications in Medicine (DICOM) format image data are available to nonaffiliated external healthcare facilities or entities on a secure, media free, reciprocally searchable basis with patient authorization for at least 12 months after this study. _______________ FINDINGS: LOWER CHEST: Unremarkable. LIVER, BILIARY: The liver is of diminished attenuation. No biliary dilation. Gallbladder is unremarkable. PANCREAS: Normal. SPLEEN: Normal. ADRENALS: Normal. KIDNEYS: There are a few subcentimeter hypodensities within the left kidney to small to characterize but likely small cysts. LYMPH NODES: No enlarged lymph nodes. GASTROINTESTINAL TRACT: No bowel dilation or wall thickening. There is lobular circumferential thickening of the cecum/ascending colon. PELVIC ORGANS: Unremarkable. VASCULATURE: Unremarkable. BONES: No acute or aggressive osseous abnormalities identified. OTHER: None. _______________     Lobular circumferential thickening of the ascending colon/cecum. Suspect a neoplastic process. Fatty infiltration of the liver.              [facility]     CC: None

## 2021-04-17 NOTE — PROGRESS NOTES
Received OT eval and treat orders. Screened patient to identify level of assistance needed for ADLs and functional mobility/transfers. Patient presents to be at baseline and is independent in performing ADLs and functional mobility/transfers. Pt is not appropriate for skilled OT interventions at this time. Current OT orders weill be discontinued.     Thank you for this referral.    Pavel Umanzor, OTR/L

## 2021-04-17 NOTE — PROGRESS NOTES
Problem: Falls - Risk of  Goal: *Absence of Falls  Description: Document Valentina Lowe Fall Risk and appropriate interventions in the flowsheet.   Outcome: Progressing Towards Goal  Note: Fall Risk Interventions:            Medication Interventions: Patient to call before getting OOB, Teach patient to arise slowly         History of Falls Interventions: Evaluate medications/consider consulting pharmacy         Problem: Patient Education: Go to Patient Education Activity  Goal: Patient/Family Education  Outcome: Progressing Towards Goal

## 2021-04-17 NOTE — OP NOTES
OPERATIVE NOTE    Patient: Chiquita Noonan MRN: [id number]  SSN: [id number]    YOB: 1970  Age: 48 y.o. Sex: male      Indications: This is a 48y.o. year-old male who presents with obstructing right colon mass. he  was positive for CEA of 85.9. The colonoscopy results were suspicious for colon cancer. The pt is being taken to the operating room for definitive surgery. Date of Procedure: 4/16/2021     Preoperative Diagnosis: OBSTRUCTING RIGHT COLON CANCER, ELEVATED LIVER ENZYMES    Postoperative Diagnosis: OBSTRUCTING RIGHT COLON CANCER, ELEVATED LIVER ENZYMES      Procedure: Procedure(s):  ROBOTIC RIGHT COLECTOMY [place] with LIVER WEDGE BIOPSY    Surgeon(s): Surgeon(s) and Role:     Ana Rosa Pfeiffer DO - Primary    Assistant(s): Circ-1: Andres Bear RN  Circ-2: Juan J Fox RN  Circ-Relief: Peggy Acuña RN  Scrub Tech-1: Beata Gibbs  Surg Asst-1: Oneyda Mota  Surg Asst-Relief: Dago Graham    Anesthesia: General     Procedure: The patient was consented and all questions answered. Consent was verified. The patients operative site was marked. The patient was brought to the OR and placed in the supine position. Pre-operative antibiotics were dosed prior to the incision. Heparin was given in the preoperative holding area for DVT prophylaxis. The patient was secured with a lower body strap and all pressure points were padded. A sohail hugger warming unit was placed across the upper body and SCDs were placed to bilateral lower extremities. General anesthesia was then induced without complication. A Phlilip catheter was placed after the patient was anesthetized. The patients abdomen was prepped and draped in the standard sterile fashion. A surgical timeout was completed. A supraumbilical incision was made and the abdomen entered. The assist wound protector with cap was placed.  The abdomen was insufflated and a camera was placed into the abdomen through the wound assist. Upon visual inspection the area of concern was identified in the proximal transverse colon. Under direct visualization an exparel TAP block was performed. The peritoneal cavity was visualized with no injury was noted from entry. Then 4 trocars were placed in the suprapubic to left upper quadrant positions. An airseal trocar was also placed in the left side. The Xi robot was then docked in the standard fashion Instruments were then placed and the abdomen the ascending colon was grasped and lifted. The cecum and terminal ileum were identified. No other intraabdominal pathology was noted. The decision was made at this point to proceed with right colectomy. The mesentery was scored with electrocautery and the ileocolic pedicle identified. This pedicle was transected with the vessel sealer. Starting at the cecum, the ascending colon was mobilized medial to lateral. The colonic mobilization was continued to the proximal transverse colon freeing the hepatic flexure. The right branch of the middle colic artery was also ligated using the energy device. The terminal ileum was transected at approximately 10cm from the ICV using a 60mm stapler with a blue load after injection of ICG and confirmation of perfusion. The duodenum was appropriately visualized and protected as well as the right ureter. Additionally the greater omentum was dissected from the proximal transverse colon. The transverse colon was found to be freely mobile. The terminal ileum and small bowel were mobile. The mid transverse colon was transected using 60mm stapler with a blue load after injection of ICG and confirmation of perfusion. The specimen was retrieved and passed off the field for pathologic evaluation. The mobilized ileum and transverse colon were exteriorized through the wound protector.  The ileum and transverse colon were laid parallel to each other in an isoperistaltic fashion aligning the anti-mesenteric borders with the small bowel inferior and anterior to the colon. Small enterotomies made in each. A 75mm blue load TIA stapler was placed down each limb of bowel and creating the common channel of the anastomosis. Another 75mm blue load TIA stapler was used to extend the common channel. After firing the stapler, the staple line was inspected endoluminally with a ring forceps and found to be widely patent and hemostatic. The common enterotomy was then closed using a 4-0 pds [place] stitch. After hemostasis at the staple line was confirmed, the bowel was carefully returned to the abdomen. The liver was visualized through the open wound protector. Wedge liver resection was then performed directly. The edge of the liver was grasped with a ring forceps. Cautery was used to resect a portion of the left lobe for analysis. Hemostasis was confirmed. The cap was reapplied and insufflation reestablished. The abdomen was irrigated and irrigant removed via suction. The abdomen was inspected and found to be hemostatic and the anastomosis in good position without torsion, tension or herniation of loops. Omentum was placed over the anastomosis. The 8mm trocars were removed under direct visualization and pneumoperitoneum released. The midline fascia was closed a running #1 PDS suture and the skin closed with 2-0 stratafix. The wounds were irrigated and a monocryl subcuticular closure was performed on 8mm incisions. All incisions were then dressed with prineo and mepilex borders. The patient was awakened from anesthesia and extubated without complication and transported to the PACU in stable condition having tolerated the procedure well. Sponge/ Needle/ Instrument count reported as correct.     Findings: Large ascending tumor, no other gross metastasis    Estimated Blood Loss: Minimal, less than 10cc    Specimens:   ID Type Source Tests Collected by Time Destination   1 : Liver wedge biopsy Preservative Liver  Melchor Mejias DO 4/16/2021 8981 Pathology   2 : Right Colon Preservative Colon, Right  Samanthasangeeta Banks, DO 4/16/2021 1220 Pathology        Drains: none    Implants: * No implants in log *    Complications: None; patient tolerated the procedure well.     Dino Rob,   4/16/21  5:40 PM

## 2021-04-17 NOTE — PROGRESS NOTES
Surgery Post Operative Day Note    Pasquale Polanco    Admit Date: 4/11/2021  Surgery Date:  [unfilled]  POD: 1 Day Post-Op    Subjective:      Patient present conditions: No significant medical complaints    Objective:     Patient Vitals for the past 24 hrs:   BP Temp Pulse Resp SpO2   04/17/21 1143 (!) 181/98 98.1 °F (36.7 °C) 78 17 99 %   04/17/21 0801 (!) 171/96 98.1 °F (36.7 °C) 73 17 99 %   04/17/21 0354 (!) 168/86 98.4 °F (36.9 °C) 71 16 100 %   04/17/21 0037 (!) 149/81 98.5 °F (36.9 °C) 81 16 99 %   04/16/21 1915 (!) 169/89 98.6 °F (37 °C) 88 16 98 %   04/16/21 1806 (!) 152/98 97.4 °F (36.3 °C) 84 16 97 %   04/16/21 1511 (!) 153/93 99.2 °F (37.3 °C) 80 14 98 %   04/16/21 1359 (!) 158/96 97.8 °F (36.6 °C) 92 13 98 %   04/16/21 1342   87 13 98 %   04/16/21 1336   94 14 97 %   04/16/21 1330 (!) 148/87 98.4 °F (36.9 °C) 96 17 95 %   04/16/21 1325 (!) 143/83  84 14 99 %   04/16/21 1320 (!) 140/80  88 15 98 %   04/16/21 1319   89 15 97 %   04/16/21 1318   93 17 97 %   04/16/21 1317   89 16 99 %   04/16/21 1316   90 15 99 %   04/16/21 1315 (!) 142/79  90 15 99 %   04/16/21 1314   99 17 100 %   04/16/21 1313   97 13 99 %   04/16/21 1312   (!) 101 19 93 %   04/16/21 1311   99 17 95 %   04/16/21 1310 (!) 140/82 97.8 °F (36.6 °C) 94 18 96 %      04/15 1901 - 04/17 0700  In: 6701 [P.O.:660; I.V.:2600]  Out: 9079 [Urine:3950]  Recent Results (from the past 24 hour(s))   METABOLIC PANEL, COMPREHENSIVE    Collection Time: 04/17/21  2:00 AM   Result Value Ref Range    Sodium 140 136 - 145 mmol/L    Potassium 3.7 3.5 - 5.5 mmol/L    Chloride 109 100 - 111 mmol/L    CO2 26 21 - 32 mmol/L    Anion gap 5 3.0 - 18 mmol/L    Glucose 118 (H) 74 - 99 mg/dL    BUN 7 7.0 - 18 MG/DL    Creatinine 0.82 0.6 - 1.3 MG/DL    BUN/Creatinine ratio 9 (L) 12 - 20      GFR est AA >60 >60 ml/min/1.73m2    GFR est non-AA >60 >60 ml/min/1.73m2    Calcium 8.0 (L) 8.5 - 10.1 MG/DL    Bilirubin, total 0.3 0.2 - 1.0 MG/DL    ALT (SGPT) 45 16 - 61 U/L    AST (SGOT) 35 10 - 38 U/L    Alk. phosphatase 42 (L) 45 - 117 U/L    Protein, total 5.8 (L) 6.4 - 8.2 g/dL    Albumin 3.1 (L) 3.4 - 5.0 g/dL    Globulin 2.7 2.0 - 4.0 g/dL    A-G Ratio 1.1 0.8 - 1.7     CBC WITH AUTOMATED DIFF    Collection Time: 04/17/21  2:00 AM   Result Value Ref Range    WBC 8.5 4.6 - 13.2 K/uL    RBC 3.89 (L) 4.35 - 5.65 M/uL    HGB 11.8 (L) 13.0 - 16.0 g/dL    HCT 36.4 36.0 - 48.0 %    MCV 93.6 74.0 - 97.0 FL    MCH 30.3 24.0 - 34.0 PG    MCHC 32.4 31.0 - 37.0 g/dL    RDW 12.1 11.6 - 14.5 %    PLATELET 263 556 - 864 K/uL    MPV 9.2 9.2 - 11.8 FL    NEUTROPHILS 77 (H) 40 - 73 %    LYMPHOCYTES 10 (L) 21 - 52 %    MONOCYTES 13 (H) 3 - 10 %    EOSINOPHILS 0 0 - 5 %    BASOPHILS 0 0 - 2 %    ABS. NEUTROPHILS 6.6 1.8 - 8.0 K/UL    ABS. LYMPHOCYTES 0.9 0.9 - 3.6 K/UL    ABS. MONOCYTES 1.1 0.05 - 1.2 K/UL    ABS. EOSINOPHILS 0.0 0.0 - 0.4 K/UL    ABS. BASOPHILS 0.0 0.0 - 0.1 K/UL    DF AUTOMATED     PHOSPHORUS    Collection Time: 04/17/21  2:00 AM   Result Value Ref Range    Phosphorus 2.0 (L) 2.5 - 4.9 MG/DL   MAGNESIUM    Collection Time: 04/17/21  2:00 AM   Result Value Ref Range    Magnesium 2.3 1.6 - 2.6 mg/dL       Abdomen:             Abdominal Assessment: Passing flatus, Tender, Other (comment)(midline incision with lap sites) appropriate ttp  Wound: CDI    Status:                   [REMOVED] Peripheral IV 04/11/21 Left Antecubital-Dressing Status: Clean, dry, & intact  Peripheral IV 04/16/21 Right; Outer Hand-Dressing Status: Clean, dry, & intact    Dressing Changes:[REMOVED] Peripheral IV 04/11/21 Left Antecubital-Dressing Status: Clean, dry, & intact  Peripheral IV 04/16/21 Right; Outer Hand-Dressing Status: Clean, dry, & intact    Pain Level:            minimal    Nausea and Vomiting: minus    Stool: plus, flatus and watery stool x 2            Assessment:     Progress: good , adjust IV's or meds and treat BP    Plan:     Diet: regular diet  Plan for continued care: Pt with uncontrolled HTN. May be related to cessation of 8-10 alcohol drinks a night for surgery. Counseled pt on decreasing alcohol intake as it is having systemic effects and has caused elevated liver function test.  Pt had 2 BM. Will have regular lunch and may go home. Ambulating well. IS. Continue DVT proph for 30 days. Education on wounds and showering given.

## 2021-04-17 NOTE — DISCHARGE INSTRUCTIONS
Patient Education        Open Bowel Resection: What to Expect at [address] [place] [address] Drive are likely to have pain that comes and goes for the next few days after bowel surgery. You may have bowel cramps, and your cut (incision) may hurt. You may also feel like you have the flu. You may have a low fever and feel tired and nauseated. This is common. You should feel better after a week and will probably be back to normal in 2 to 3 weeks. This care sheet gives you a general idea about how long it will take for you to recover. But each person recovers at a different pace. Follow the steps below to get better as quickly as possible. How can you care for yourself at home? Activity    · Rest when you feel tired. Getting enough sleep will help you recover.     · Try to walk each day. Start by walking a little more than you did the day before. Bit by bit, increase the amount you walk. Walking boosts blood flow and helps prevent pneumonia and constipation.     · Avoid strenuous activities, such as biking, jogging, weight lifting, or aerobic exercise, until your doctor says it is okay.     · Ask your doctor when you can drive again.     · You will probably need to take 3 to 4 weeks off from work. It depends on the type of work you do and how you feel. You may need to take off 4 to 6 weeks if you lift heavy objects in your job.     · You may shower 24 to 48 hours after surgery, if your doctor says it is okay. Pat the cut (incision) dry. Do not take a bath for the first 2 weeks, or until your doctor tells you it is okay.     · Ask your doctor when it is okay for you to have sex. Diet    · You may not have much appetite after the surgery. But try to eat a healthy diet. Your doctor will tell you about any foods you should not eat.     · Eat a low-fiber diet for several weeks after surgery. Eat many small meals throughout the day. Add high-fiber foods a little at a time.     · Eat yogurt.  It puts good bacteria into your colon and helps prevent diarrhea.     · Try to avoid nuts, seeds, and corn for a while. They may be hard to digest.     · You may need to take vitamins that contain sodium and potassium. Ask your doctor.     · Drink plenty of fluids to avoid becoming dehydrated. Medicines    · Your doctor will tell you if and when you can restart your medicines. He or she will also give you instructions about taking any new medicines.     · If you take aspirin or some other blood thinner, ask your doctor if and when to start taking it again. Make sure that you understand exactly what your doctor wants you to do.     · Take pain medicines exactly as directed. ? If the doctor gave you a prescription medicine for pain, take it as prescribed. ? If you are not taking a prescription pain medicine, ask your doctor if you can take an over-the-counter medicine. ? Do not take two or more pain medicines at the same time unless the doctor told you to. Many pain medicines have acetaminophen, which is Tylenol. Too much acetaminophen (Tylenol) can be harmful.     · If you think your pain medicine is making you sick to your stomach:  ? Take your medicine after meals (unless your doctor tells you not to). ? Ask your doctor for a different pain medicine.     · If your doctor prescribed antibiotics, take them as directed. Do not stop taking them just because you feel better. You need to take the full course of antibiotics.     · You may need to take some medicines in a different form. You will be told whether to crush pills or take a liquid form of the medicine.     · If your doctor gives you a stool softener, take it as directed. Incision care    · If you have strips of tape on the incision, leave the tape on for a week or until it falls off.     · Wash the area daily with warm, soapy water, and pat it dry. Follow-up care is a key part of your treatment and safety.  Be sure to make and go to all appointments, and call your doctor if you are having problems. It's also a good idea to know your test results and keep a list of the medicines you take. When should you call for help? Call 911 anytime you think you may need emergency care. For example, call if:    · You passed out (lost consciousness).     · You are short of breath. Call your doctor now or seek immediate medical care if:    · You are sick to your stomach and cannot drink fluids or keep them down.     · You have signs of a blood clot in your leg (called a deep vein thrombosis), such as:  ? Pain in your calf, back of the knee, thigh, or groin. ? Redness and swelling in your leg or groin.     · You have signs of infection, such as:  ? Increased pain, swelling, warmth, or redness. ? Red streaks leading from the incision. ? Pus draining from the incision. ? A fever.     · You have pain that does not get better after you take pain medicine.     · You have loose stitches, or your incision comes open.     · Bright red blood has soaked through the bandage.     · You cannot pass stools or gas. Watch closely for any changes in your health, and be sure to contact your doctor if you have any problems. Where can you learn more? Go to http://www.gray.com/  Enter Z281 in the search box to learn more about \"Open Bowel Resection: What to Expect at Home. \"  Current as of: April 15, 2020               Content Version: 12.8  © 2006-2021 [employer]. Care instructions adapted under license by [employer] (which disclaims liability or warranty for this information). If you have questions about a medical condition or this instruction, always ask your healthcare professional. [place] [address] any warranty or liability for your use of this information. Patient Education        Learning About Colon Cancer  What is colon cancer? Colon cancer happens when cells that are not normal grow in your colon.  These cells grow together and form tumors. Colon cancer occurs most often in people older than 48. As with other cancers, treatment works best when colon cancer is found early. What happens when you have colon cancer? Most cases begin as polyps, which are small growths inside the colon. These polyps are very common, and most of them do not turn into cancer. Colon cancer usually grows very slowly. It usually takes years for the cancer to become large enough to cause symptoms. If the cancer is not removed and keeps growing, it eventually will invade and destroy nearby tissues and then spread farther, first to nearby lymph nodes. From there it may spread to other parts of the body. What are the symptoms? Colon cancer in its early stages usually doesn't cause any symptoms. Symptoms occur later, when the cancer may be harder to treat. The most common symptoms include:  · Pain in the belly. · Blood in your stool or very dark stools. · A change in your bowel habits, such as more frequent stools or a feeling that your bowels are not emptying completely. · Always feeling tired. How can you prevent colon cancer? Screening tests can find or prevent many cases of colon cancer. They look for a certain disease or condition before any symptoms appear. Screening tests that may find colon cancer early include:  · Stool tests, such as the fecal immunochemical test or the guaiac fecal occult blood test.  · Sigmoidoscopy, which lets your doctor look at the inside of the lower part of your colon using a lighted tube. · Colonoscopy, which lets your doctor look at the inside of your entire colon using a thin, flexible tube. Your risk for colorectal cancer gets higher as you get older. Some experts say that adults should start regular screening at age 48 and stop at age 76. Others say to start before age 48 or continue after age 76. Talk with your doctor about your risk and when to start and stop screening.   People with a higher risk, such as those with a strong family history of colon cancer, should be tested earlier than those with an average risk. Here are other things you can do to help prevent colon cancer:  · Watch your weight. Being very overweight may increase your chance of getting colon cancer. · Eat well. Eat more whole grains, fruits, vegetables, poultry, and fish. And eat less red meat, refined grains, and sweets. · If you drink alcohol, limit how much you drink. Any amount of alcohol may increase your risk for some types of cancer. · Get active. Keep up a physically active lifestyle. · Quit smoking. If you smoke cigarettes, quit smoking to reduce your chance of getting colon cancer. How is colon cancer treated? · Surgery is almost always used to treat colon cancer. The cancer is more easily removed when it is found early. · If the cancer has spread into the wall of the colon or farther, you may also need radiation or chemotherapy. Follow-up care is a key part of your treatment and safety. Be sure to make and go to all appointments, and call your doctor if you are having problems. It's also a good idea to know your test results and keep a list of the medicines you take. Where can you learn more? Go to http://www.gray.com/  Enter M414 in the search box to learn more about \"Learning About Colon Cancer. \"  Current as of: December 17, 2020               Content Version: 12.8  © 0843-4589 [employer]. Care instructions adapted under license by [employer] (which disclaims liability or warranty for this information). If you have questions about a medical condition or this instruction, always ask your healthcare professional. [place] [address] any warranty or liability for your use of this information.

## 2021-04-17 NOTE — PROGRESS NOTES
Problem: Falls - Risk of  Goal: *Absence of Falls  Description: Document Kamron Hargrove Fall Risk and appropriate interventions in the flowsheet. Outcome: Progressing Towards Goal  Note: Fall Risk Interventions:            Medication Interventions: Evaluate medications/consider consulting pharmacy, Teach patient to arise slowly         History of Falls Interventions: Evaluate medications/consider consulting pharmacy         Problem: Patient Education: Go to Patient Education Activity  Goal: Patient/Family Education  Outcome: Progressing Towards Goal     Problem: Patient Education: Go to Patient Education Activity  Goal: Patient/Family Education  Outcome: Progressing Towards Goal     Problem: Colorectal Surgery:Day Two  Goal: *No signs and symptoms of infection or wound complications  Outcome: Progressing Towards Goal  Goal: *Optimal pain control at patient's stated goal  Outcome: Progressing Towards Goal  Goal: *Adequate urinary output (equal to or greater than 30 milliliters per hour)  Outcome: Progressing Towards Goal  Note: Voiding without difficulty s/p espino removal  Goal: *Lungs clear or at baseline  Outcome: Progressing Towards Goal  Goal: *Hemodynamically stable  Outcome: Progressing Towards Goal  Goal: *Tolerating diet  Outcome: Progressing Towards Goal  Note: Tolerating clear liquids well. Appetite poor/fair. Passing small amounts flatus. Denies n/v.  NABS x4.   Goal: *Demonstrates progressive activity  Outcome: Progressing Towards Goal  Goal: Nutrition/Diet  Outcome: Progressing Towards Goal  Goal: Activity/Safety  Outcome: Progressing Towards Goal  Goal: Psychosocial  Outcome: Progressing Towards Goal  Goal: Respiratory  Outcome: Progressing Towards Goal  Goal: Medications  Outcome: Progressing Towards Goal  Goal: Treatments/Interventions/Procedures  Outcome: Progressing Towards Goal  Goal: Diagnostic Test/Procedures  Outcome: Progressing Towards Goal  Goal: Discharge Planning  Outcome: Progressing Towards Goal     Problem: Pain  Goal: *Control of Pain  Outcome: Progressing Towards Goal  Note: Reports satisfaction with scheduled regimen, ice, and ambulation     Problem: Patient Education: Go to Patient Education Activity  Goal: Patient/Family Education  Outcome: Progressing Towards Goal

## 2021-05-03 ENCOUNTER — TRANSCRIBE ORDER (OUTPATIENT)
Dept: REGISTRATION | Age: 51
End: 2021-05-03

## 2021-05-03 ENCOUNTER — HOSPITAL ENCOUNTER (OUTPATIENT)
Dept: LAB | Age: 51
Discharge: HOME | End: 2021-05-03
Payer: OTHER GOVERNMENT

## 2021-05-03 DIAGNOSIS — C18.2 MALIGNANT NEOPLASM OF ASCENDING COLON (HCC): Primary | ICD-10-CM

## 2021-05-03 LAB
HCT VFR BLD AUTO: 37.5 % (ref 36–48)
HGB BLD-MCNC: 12.1 G/DL (ref 13–16)
POTASSIUM SERPL-SCNC: 3.8 MMOL/L (ref 3.5–5.5)

## 2021-05-03 PROCEDURE — 85018 HEMOGLOBIN: CPT

## 2021-05-03 PROCEDURE — 36415 COLL VENOUS BLD VENIPUNCTURE: CPT

## 2021-05-03 PROCEDURE — 84132 ASSAY OF SERUM POTASSIUM: CPT

## 2021-05-10 ENCOUNTER — HOSPITAL ENCOUNTER (OUTPATIENT)
Dept: NON INVASIVE DIAGNOSTICS | Age: 51
Discharge: HOME | End: 2021-05-10
Payer: OTHER GOVERNMENT

## 2021-05-10 ENCOUNTER — TRANSCRIBE ORDER (OUTPATIENT)
Dept: REGISTRATION | Age: 51
End: 2021-05-10

## 2021-05-10 DIAGNOSIS — Z01.818 PREOPERATIVE EXAMINATION, UNSPECIFIED: ICD-10-CM

## 2021-05-10 DIAGNOSIS — Z01.818 PREOPERATIVE EXAMINATION, UNSPECIFIED: Primary | ICD-10-CM

## 2021-05-10 LAB
ATRIAL RATE: 71 BPM
CALCULATED P AXIS, ECG09: 63 DEGREES
CALCULATED R AXIS, ECG10: 81 DEGREES
CALCULATED T AXIS, ECG11: 73 DEGREES
DIAGNOSIS, 93000: NORMAL
P-R INTERVAL, ECG05: 176 MS
Q-T INTERVAL, ECG07: 372 MS
QRS DURATION, ECG06: 92 MS
QTC CALCULATION (BEZET), ECG08: 404 MS
VENTRICULAR RATE, ECG03: 71 BPM

## 2021-05-10 PROCEDURE — 93005 ELECTROCARDIOGRAM TRACING: CPT

## 2021-06-26 ENCOUNTER — APPOINTMENT (OUTPATIENT)
Dept: CT IMAGING | Age: 51
End: 2021-06-26
Attending: EMERGENCY MEDICINE
Payer: OTHER GOVERNMENT

## 2021-06-26 ENCOUNTER — HOSPITAL ENCOUNTER (EMERGENCY)
Age: 51
Discharge: HOME | End: 2021-06-26
Attending: EMERGENCY MEDICINE
Payer: OTHER GOVERNMENT

## 2021-06-26 VITALS
OXYGEN SATURATION: 98 % | HEIGHT: 70 IN | SYSTOLIC BLOOD PRESSURE: 108 MMHG | WEIGHT: 195 LBS | BODY MASS INDEX: 27.92 KG/M2 | RESPIRATION RATE: 20 BRPM | TEMPERATURE: 98 F | HEART RATE: 82 BPM | DIASTOLIC BLOOD PRESSURE: 67 MMHG

## 2021-06-26 DIAGNOSIS — R10.13 ACUTE EPIGASTRIC PAIN: Primary | ICD-10-CM

## 2021-06-26 LAB
ALBUMIN SERPL-MCNC: 4 G/DL (ref 3.4–5)
ALBUMIN/GLOB SERPL: 1.3 {RATIO} (ref 0.8–1.7)
ALP SERPL-CCNC: 76 U/L (ref 45–117)
ALT SERPL-CCNC: 35 U/L (ref 16–61)
ANION GAP SERPL CALC-SCNC: 8 MMOL/L (ref 3–18)
AST SERPL-CCNC: 35 U/L (ref 10–38)
BASOPHILS # BLD: 0 K/UL (ref 0–0.1)
BASOPHILS NFR BLD: 1 % (ref 0–2)
BILIRUB SERPL-MCNC: 0.5 MG/DL (ref 0.2–1)
BUN SERPL-MCNC: 9 MG/DL (ref 7–18)
BUN/CREAT SERPL: 11 (ref 12–20)
CALCIUM SERPL-MCNC: 9.2 MG/DL (ref 8.5–10.1)
CHLORIDE SERPL-SCNC: 105 MMOL/L (ref 100–111)
CO2 SERPL-SCNC: 25 MMOL/L (ref 21–32)
CREAT SERPL-MCNC: 0.82 MG/DL (ref 0.6–1.3)
DIFFERENTIAL METHOD BLD: ABNORMAL
EOSINOPHIL # BLD: 0.1 K/UL (ref 0–0.4)
EOSINOPHIL NFR BLD: 2 % (ref 0–5)
ERYTHROCYTE [DISTWIDTH] IN BLOOD BY AUTOMATED COUNT: 14.6 % (ref 11.6–14.5)
GLOBULIN SER CALC-MCNC: 3 G/DL (ref 2–4)
GLUCOSE SERPL-MCNC: 125 MG/DL (ref 74–99)
HCT VFR BLD AUTO: 36.6 % (ref 36–48)
HGB BLD-MCNC: 12.6 G/DL (ref 13–16)
LIPASE SERPL-CCNC: 207 U/L (ref 73–393)
LYMPHOCYTES # BLD: 2.6 K/UL (ref 0.9–3.6)
LYMPHOCYTES NFR BLD: 46 % (ref 21–52)
MCH RBC QN AUTO: 29.4 PG (ref 24–34)
MCHC RBC AUTO-ENTMCNC: 34.4 G/DL (ref 31–37)
MCV RBC AUTO: 85.5 FL (ref 74–97)
MONOCYTES # BLD: 0.6 K/UL (ref 0.05–1.2)
MONOCYTES NFR BLD: 11 % (ref 3–10)
NEUTS SEG # BLD: 2.3 K/UL (ref 1.8–8)
NEUTS SEG NFR BLD: 40 % (ref 40–73)
PLATELET # BLD AUTO: 208 K/UL (ref 135–420)
PMV BLD AUTO: 8.8 FL (ref 9.2–11.8)
POTASSIUM SERPL-SCNC: 3.7 MMOL/L (ref 3.5–5.5)
PROT SERPL-MCNC: 7 G/DL (ref 6.4–8.2)
RBC # BLD AUTO: 4.28 M/UL (ref 4.35–5.65)
SODIUM SERPL-SCNC: 138 MMOL/L (ref 136–145)
WBC # BLD AUTO: 5.7 K/UL (ref 4.6–13.2)

## 2021-06-26 PROCEDURE — 74011000636 HC RX REV CODE- 636: Performed by: EMERGENCY MEDICINE

## 2021-06-26 PROCEDURE — 74011250636 HC RX REV CODE- 250/636: Performed by: EMERGENCY MEDICINE

## 2021-06-26 PROCEDURE — C9113 INJ PANTOPRAZOLE SODIUM, VIA: HCPCS | Performed by: EMERGENCY MEDICINE

## 2021-06-26 PROCEDURE — 80053 COMPREHEN METABOLIC PANEL: CPT

## 2021-06-26 PROCEDURE — 83690 ASSAY OF LIPASE: CPT

## 2021-06-26 PROCEDURE — 96374 THER/PROPH/DIAG INJ IV PUSH: CPT

## 2021-06-26 PROCEDURE — 85025 COMPLETE CBC W/AUTO DIFF WBC: CPT

## 2021-06-26 PROCEDURE — 96375 TX/PRO/DX INJ NEW DRUG ADDON: CPT

## 2021-06-26 PROCEDURE — 99284 EMERGENCY DEPT VISIT MOD MDM: CPT

## 2021-06-26 PROCEDURE — 74177 CT ABD & PELVIS W/CONTRAST: CPT

## 2021-06-26 PROCEDURE — 96361 HYDRATE IV INFUSION ADD-ON: CPT

## 2021-06-26 RX ORDER — HYDROCODONE BITARTRATE AND ACETAMINOPHEN 5; 325 MG/1; MG/1
1 TABLET ORAL
Qty: 20 TABLET | Refills: 0 | Status: SHIPPED | OUTPATIENT
Start: 2021-06-26 | End: 2021-07-01

## 2021-06-26 RX ORDER — FAMOTIDINE 20 MG/1
20 TABLET, FILM COATED ORAL
Qty: 28 TABLET | Refills: 0 | Status: SHIPPED | OUTPATIENT
Start: 2021-06-26 | End: 2021-07-10

## 2021-06-26 RX ORDER — MAG HYDROX/ALUMINUM HYD/SIMETH 200-200-20
30 SUSPENSION, ORAL (FINAL DOSE FORM) ORAL
Qty: 769 ML | Refills: 0 | Status: ON HOLD | OUTPATIENT
Start: 2021-06-26 | End: 2022-01-12

## 2021-06-26 RX ORDER — PANTOPRAZOLE SODIUM 40 MG/10ML
40 INJECTION, POWDER, LYOPHILIZED, FOR SOLUTION INTRAVENOUS
Status: COMPLETED | OUTPATIENT
Start: 2021-06-26 | End: 2021-06-26

## 2021-06-26 RX ORDER — MORPHINE SULFATE 4 MG/ML
4 INJECTION INTRAVENOUS ONCE
Status: COMPLETED | OUTPATIENT
Start: 2021-06-26 | End: 2021-06-26

## 2021-06-26 RX ADMIN — IOPAMIDOL 100 ML: 612 INJECTION, SOLUTION INTRAVENOUS at 03:02

## 2021-06-26 RX ADMIN — MORPHINE SULFATE 4 MG: 4 INJECTION INTRAVENOUS at 02:24

## 2021-06-26 RX ADMIN — PANTOPRAZOLE SODIUM 40 MG: 40 INJECTION, POWDER, FOR SOLUTION INTRAVENOUS at 02:24

## 2021-06-26 RX ADMIN — SODIUM CHLORIDE 500 ML: 900 INJECTION, SOLUTION INTRAVENOUS at 02:24

## 2021-06-26 NOTE — ED TRIAGE NOTES
Pt arrives ambulatory to triage with c/o ABD pain that began 1 hour PTA. Pt states he has stage 3 colon CA and had a partial colectomy in April. Denies any NV, bowel or urinary complaints.

## 2021-06-26 NOTE — ED PROVIDER NOTES
EMERGENCY DEPARTMENT HISTORY AND PHYSICAL EXAM    Date: 6/26/2021  Patient Name: Willard Lambert II    History of Presenting Illness     Chief Complaint   Patient presents with    Abdominal Pain         History Provided By: Patient    Willard Lambert II is a 48 y.o. male who presents to the emergency department C/O abdominal pain. Patient states the pain started abruptly about an hour prior to arrival located in the epigastric region without radiation. States he had some mild nausea when the pain became severe but denies any vomiting diarrhea or constipation. States he has had abdominal pain before although it was in the lower region several months ago when he was diagnosed with a colon mass. States he was diagnosed with stage III colon cancer and had the mass removed with a partial colectomy in April 2021. States he has not had abdominal pain like this before. States he has had his appendix removed as well but still has his gallbladder. He does note a history otherwise of hypertension and gastric reflux. Rubia García PCP: Ganesh Jamil NP    Current Outpatient Medications   Medication Sig Dispense Refill    HYDROcodone-acetaminophen (Norco) 5-325 mg per tablet Take 1 Tablet by mouth every six (6) hours as needed for Pain for up to 5 days. Max Daily Amount: 4 Tablets. 20 Tablet 0    famotidine (Pepcid) 20 mg tablet Take 1 Tablet by mouth two (2) times daily as needed (heartburn) for up to 14 days. 28 Tablet 0    alum-mag hydroxide-simeth (Maalox Advanced) 200-200-20 mg/5 mL susp Take 30 mL by mouth every four (4) hours as needed (heartburn). 769 mL 0    enoxaparin (Lovenox) 40 mg/0.4 mL 0.4 mL by SubCUTAneous route daily. 30 Syringe 0    amLODIPine (NORVASC) 5 mg tablet Take 1 Tab by mouth daily. 30 Tab 0    famotidine (Pepcid AC) 20 mg tablet Take 20 mg by mouth daily.          Past History     Past Medical History:  Past Medical History:   Diagnosis Date    GERD (gastroesophageal reflux disease)     Hypertension        Past Surgical History:  Past Surgical History:   Procedure Laterality Date    COLONOSCOPY N/A 4/12/2021    COLONOSCOPY; BIOPSY performed by Jj Soto [facility] at [facility]    HX TONSILLECTOMY         Family History:  Family History   Problem Relation Age of Onset    Cancer Mother     Cancer Father     Cancer Maternal Uncle        Social History:  Social History     Tobacco Use    Smoking status: Never Smoker    Smokeless tobacco: Current User   Vaping Use    Vaping Use: Never used   Substance Use Topics    Alcohol use: Yes     Alcohol/week: 7.0 - 8.0 standard drinks     Types: 7 - 8 Cans of beer per week    Drug use: Never       Allergies:  No Known Allergies      Review of Systems   Review of Systems   Constitutional: Negative for fever. Respiratory: Negative for shortness of breath. Cardiovascular: Negative for chest pain. Gastrointestinal: Positive for abdominal pain and nausea. Negative for abdominal distention, rectal pain and vomiting. All other systems reviewed and are negative. All other systems reviewed and are negative.     Physical Exam     Vitals:    06/26/21 0200 06/26/21 0215 06/26/21 0230 06/26/21 0245   BP: 116/73 (!) 112/100 112/70 108/67   Pulse: 90 86 82    Resp: 15 15 20    Temp:       SpO2: 99% 97% 98%    Weight:       Height:         Physical Exam    Nursing notes and vital signs reviewed    Constitutional: Non toxic appearing, mild distress, appearing stated age  Eyes: PERRL, EOMI, No conjunctival injection  ENT: external ears normal, No rhinorrhea, external nose normal, mucous membranes moist  Cardiovascular: Regular rate and rhythm, no murmurs, No JVD  Respiratory: Clear to ausculation bilaterally, No stridor, Normal work of breathing and chest excursion bilaterally  Abdomen: Soft, reproducible epigastric tenderness, non distended, normoactive bowel sounds, No rigidity, no peritoneal signs  Musculoskeletal:  No evidence of obvious injury to Head, Neck, Back, Extremities, no LE edema  Skin: Warm, dry, No obvious rashes  Neuro: Alert and oriented x 3, CN 2-12 intact, normal speech, strength and sensation full and symmetric bilaterally  Psychiatric: Normal mood and affect      Diagnostic Study Results     Labs -     Recent Results (from the past 72 hour(s))   CBC WITH AUTOMATED DIFF    Collection Time: 06/26/21  1:50 AM   Result Value Ref Range    WBC 5.7 4.6 - 13.2 K/uL    RBC 4.28 (L) 4.35 - 5.65 M/uL    HGB 12.6 (L) 13.0 - 16.0 g/dL    HCT 36.6 36.0 - 48.0 %    MCV 85.5 74.0 - 97.0 FL    MCH 29.4 24.0 - 34.0 PG    MCHC 34.4 31.0 - 37.0 g/dL    RDW 14.6 (H) 11.6 - 14.5 %    PLATELET 651 990 - 217 K/uL    MPV 8.8 (L) 9.2 - 11.8 FL    NEUTROPHILS 40 40 - 73 %    LYMPHOCYTES 46 21 - 52 %    MONOCYTES 11 (H) 3 - 10 %    EOSINOPHILS 2 0 - 5 %    BASOPHILS 1 0 - 2 %    ABS. NEUTROPHILS 2.3 1.8 - 8.0 K/UL    ABS. LYMPHOCYTES 2.6 0.9 - 3.6 K/UL    ABS. MONOCYTES 0.6 0.05 - 1.2 K/UL    ABS. EOSINOPHILS 0.1 0.0 - 0.4 K/UL    ABS. BASOPHILS 0.0 0.0 - 0.1 K/UL    DF AUTOMATED     METABOLIC PANEL, COMPREHENSIVE    Collection Time: 06/26/21  1:50 AM   Result Value Ref Range    Sodium 138 136 - 145 mmol/L    Potassium 3.7 3.5 - 5.5 mmol/L    Chloride 105 100 - 111 mmol/L    CO2 25 21 - 32 mmol/L    Anion gap 8 3.0 - 18 mmol/L    Glucose 125 (H) 74 - 99 mg/dL    BUN 9 7.0 - 18 MG/DL    Creatinine 0.82 0.6 - 1.3 MG/DL    BUN/Creatinine ratio 11 (L) 12 - 20      GFR est AA >60 >60 ml/min/1.73m2    GFR est non-AA >60 >60 ml/min/1.73m2    Calcium 9.2 8.5 - 10.1 MG/DL    Bilirubin, total 0.5 0.2 - 1.0 MG/DL    ALT (SGPT) 35 16 - 61 U/L    AST (SGOT) 35 10 - 38 U/L    Alk.  phosphatase 76 45 - 117 U/L    Protein, total 7.0 6.4 - 8.2 g/dL    Albumin 4.0 3.4 - 5.0 g/dL    Globulin 3.0 2.0 - 4.0 g/dL    A-G Ratio 1.3 0.8 - 1.7     LIPASE    Collection Time: 06/26/21  1:50 AM   Result Value Ref Range    Lipase 207 73 - 393 U/L       Radiologic Studies -   CT ABD PELV W CONT   Final Result      No acute findings to explain pain symptoms. Interval proximal colectomy with   anastomosis. No evidence of complication. CT Results  (Last 48 hours)               06/26/21 0329  CT ABD PELV W CONT Final result    Impression:      No acute findings to explain pain symptoms. Interval proximal colectomy with   anastomosis. No evidence of complication. Narrative:  EXAM: CT ABDOMEN AND PELVIS        CLINICAL INDICATION/HISTORY:  Epigastric pain. COMPARISON: 4/12/2021       TECHNIQUE: Axial CT imaging of the abdomen and pelvis was performed with   intravenous contrast. Multiplanar reformats were generated. One or more dose   reduction techniques were used on this CT: automated exposure control,   adjustment of the mAs and/or kVp according to patient size, and iterative   reconstruction techniques. The specific techniques used on this CT exam have   been documented in the patient's electronic medical record. Digital Imaging and   Communications in Medicine (DICOM) format image data are available to   nonaffiliated external healthcare facilities or entities on a secure, media   free, reciprocally searchable basis with patient authorization for at least a   12-month period after this study. _______________       FINDINGS:       LOWER CHEST: Unremarkable. LIVER, BILIARY: Evidence of mild hepatic steatosis, liver appears otherwise   normal. No biliary dilation. Gallbladder is unremarkable. SPLEEN: Normal.       PANCREAS: Normal.       ADRENALS: Normal.       KIDNEYS/URETERS/BLADDER: Normal.       LYMPH NODES: No enlarged lymph nodes. GASTROINTESTINAL TRACT: No bowel dilation or wall thickening. Proximal colectomy   with anastomosis. No evidence of complication. Very small hiatal hernia. VASCULATURE: Unremarkable. PELVIC ORGANS: Unremarkable. BONES: No acute or aggressive osseous abnormalities identified. OTHER: None. _______________               CXR Results  (Last 48 hours)    None          Medications given in the ED-  Medications   morphine injection 4 mg (4 mg IntraVENous Given 6/26/21 0224)   sodium chloride 0.9 % bolus infusion 500 mL (500 mL IntraVENous New Bag 6/26/21 0224)   pantoprazole (PROTONIX) injection 40 mg (40 mg IntraVENous Given 6/26/21 0224)   iopamidoL (ISOVUE 300) 61 % contrast injection  mL (100 mL IntraVENous Given 6/26/21 0302)         Medical Decision Making     I reviewed the vital signs, available nursing notes, past medical history, past surgical history, family history and social history. Vital Signs interpretation- I have reviewed the patient's vital signs. Pulse Oximetry interpretation - 98% on Room air     Cardiac Monitor interpretation:  Rate: 82 bpm  Rhythm: sinus    Records Reviewed: Nursing Notes and Old Medical Records    Procedures:  Procedures    ED Course and MDM:  Considering the nature of his recent surgery and colon cancer will obtain blood work and CT scan. Will give morphine and Protonix and IV fluids and reassess    Patient states his symptoms are starting to improve. His laboratory findings are unremarkable and his CT scan is negative. Discussed with the patient the results of their emergency department testing. I stated that there are many causes for abdominal pain to include gastrointestinal etiology, hepatobiliary etiology, vs other etiologies. Avoid any activities that bring on the abdominal pain. Also, avoid any tobacco products or excessive alcohol. I recommended they take prescribed medications as directed and follow-up with their primary care physician or specialist as indicated. Recommended to come back to the emergency department if symptoms worsen or they develop new alarming or concerning symptoms.   They understand and agree to plan      Diagnosis and Disposition         DISCHARGE NOTE:    Jamison Child II's  results have been reviewed with him.  He has been counseled regarding his diagnosis, treatment, and plan. He verbally conveys understanding and agreement of the signs, symptoms, diagnosis, treatment and prognosis and additionally agrees to follow up as discussed. He also agrees with the care-plan and conveys that all of his questions have been answered. I have also provided discharge instructions for him that include: educational information regarding their diagnosis and treatment, and list of reasons why they would want to return to the ED prior to their follow-up appointment, should his condition change. He has been provided with education for proper emergency department utilization. CLINICAL IMPRESSION:    1. Acute epigastric pain        PLAN:  1. D/C Home  2. Current Discharge Medication List      START taking these medications    Details   HYDROcodone-acetaminophen (Norco) 5-325 mg per tablet Take 1 Tablet by mouth every six (6) hours as needed for Pain for up to 5 days. Max Daily Amount: 4 Tablets. Qty: 20 Tablet, Refills: 0  Start date: 6/26/2021, End date: 7/1/2021    Associated Diagnoses: Acute epigastric pain      !! famotidine (Pepcid) 20 mg tablet Take 1 Tablet by mouth two (2) times daily as needed (heartburn) for up to 14 days. Qty: 28 Tablet, Refills: 0  Start date: 6/26/2021, End date: 7/10/2021      alum-mag hydroxide-simeth (Maalox Advanced) 200-200-20 mg/5 mL susp Take 30 mL by mouth every four (4) hours as needed (heartburn). Qty: 769 mL, Refills: 0  Start date: 6/26/2021       !! - Potential duplicate medications found. Please discuss with provider. CONTINUE these medications which have NOT CHANGED    Details   !! famotidine (Pepcid AC) 20 mg tablet Take 20 mg by mouth daily. !! - Potential duplicate medications found. Please discuss with provider.         3.   Follow-up Information     Follow up With Specialties Details Why Contact Lio Mooney NP Nurse Practitioner   [address] [facility]  [phone]          _______________________________      Please note that this dictation was completed with [email or web address], the computer voice recognition software. Quite often unanticipated grammatical, syntax, homophones, and other interpretive errors are inadvertently transcribed by the computer software. Please disregard these errors. Please excuse any errors that have escaped final proofreading.

## 2021-07-30 ENCOUNTER — TRANSCRIBE ORDER (OUTPATIENT)
Dept: SCHEDULING | Age: 51
End: 2021-07-30

## 2021-07-30 DIAGNOSIS — C18.0 MALIGNANT NEOPLASM OF CECUM (HCC): Primary | ICD-10-CM

## 2021-08-11 ENCOUNTER — HOSPITAL ENCOUNTER (OUTPATIENT)
Dept: CT IMAGING | Age: 51
Discharge: HOME | End: 2021-08-11
Attending: STUDENT IN AN ORGANIZED HEALTH CARE EDUCATION/TRAINING PROGRAM
Payer: OTHER GOVERNMENT

## 2021-08-11 DIAGNOSIS — C18.0 MALIGNANT NEOPLASM OF CECUM (HCC): ICD-10-CM

## 2021-08-11 LAB — CREAT UR-MCNC: 0.9 MG/DL (ref 0.6–1.3)

## 2021-08-11 PROCEDURE — 82565 ASSAY OF CREATININE: CPT

## 2021-08-11 PROCEDURE — 74177 CT ABD & PELVIS W/CONTRAST: CPT

## 2021-08-11 PROCEDURE — 74011000250 HC RX REV CODE- 250: Performed by: STUDENT IN AN ORGANIZED HEALTH CARE EDUCATION/TRAINING PROGRAM

## 2021-08-11 PROCEDURE — 74011000636 HC RX REV CODE- 636: Performed by: STUDENT IN AN ORGANIZED HEALTH CARE EDUCATION/TRAINING PROGRAM

## 2021-08-11 RX ORDER — BARIUM SULFATE 20 MG/ML
900 SUSPENSION ORAL
Status: COMPLETED | OUTPATIENT
Start: 2021-08-11 | End: 2021-08-11

## 2021-08-11 RX ADMIN — IOPAMIDOL 100 ML: 612 INJECTION, SOLUTION INTRAVENOUS at 17:00

## 2021-08-11 RX ADMIN — BARIUM SULFATE 900 ML: 20 SUSPENSION ORAL at 17:00

## 2022-01-12 ENCOUNTER — HOSPITAL ENCOUNTER (OUTPATIENT)
Age: 52
Setting detail: OUTPATIENT SURGERY
Discharge: HOME | End: 2022-01-12
Attending: INTERNAL MEDICINE | Admitting: INTERNAL MEDICINE
Payer: OTHER GOVERNMENT

## 2022-01-12 VITALS
HEART RATE: 66 BPM | OXYGEN SATURATION: 100 % | DIASTOLIC BLOOD PRESSURE: 67 MMHG | HEIGHT: 69 IN | TEMPERATURE: 97 F | SYSTOLIC BLOOD PRESSURE: 113 MMHG | BODY MASS INDEX: 29.8 KG/M2 | RESPIRATION RATE: 16 BRPM | WEIGHT: 201.2 LBS

## 2022-01-12 PROCEDURE — 77030040361 HC SLV COMPR DVT MDII -B: Performed by: INTERNAL MEDICINE

## 2022-01-12 PROCEDURE — 77030039961 HC KT CUST COLON BSC -D: Performed by: INTERNAL MEDICINE

## 2022-01-12 PROCEDURE — 76040000007: Performed by: INTERNAL MEDICINE

## 2022-01-12 PROCEDURE — 77030013991 HC SNR POLYP ENDOSC BSC -A: Performed by: INTERNAL MEDICINE

## 2022-01-12 PROCEDURE — 74011250636 HC RX REV CODE- 250/636: Performed by: INTERNAL MEDICINE

## 2022-01-12 PROCEDURE — 2709999900 HC NON-CHARGEABLE SUPPLY: Performed by: INTERNAL MEDICINE

## 2022-01-12 PROCEDURE — 88305 TISSUE EXAM BY PATHOLOGIST: CPT

## 2022-01-12 PROCEDURE — 99153 MOD SED SAME PHYS/QHP EA: CPT | Performed by: INTERNAL MEDICINE

## 2022-01-12 PROCEDURE — G0500 MOD SEDAT ENDO SERVICE >5YRS: HCPCS | Performed by: INTERNAL MEDICINE

## 2022-01-12 RX ORDER — DIPHENHYDRAMINE HYDROCHLORIDE 50 MG/ML
50 INJECTION, SOLUTION INTRAMUSCULAR; INTRAVENOUS ONCE
Status: CANCELLED | OUTPATIENT
Start: 2022-01-12 | End: 2022-01-12

## 2022-01-12 RX ORDER — FENTANYL CITRATE 50 UG/ML
100 INJECTION, SOLUTION INTRAMUSCULAR; INTRAVENOUS
Status: DISCONTINUED | OUTPATIENT
Start: 2022-01-12 | End: 2022-01-12 | Stop reason: HOSPADM

## 2022-01-12 RX ORDER — MIDAZOLAM HYDROCHLORIDE 1 MG/ML
.25-5 INJECTION, SOLUTION INTRAMUSCULAR; INTRAVENOUS
Status: DISCONTINUED | OUTPATIENT
Start: 2022-01-12 | End: 2022-01-12 | Stop reason: HOSPADM

## 2022-01-12 RX ORDER — EPINEPHRINE 0.1 MG/ML
1 INJECTION INTRACARDIAC; INTRAVENOUS
Status: CANCELLED | OUTPATIENT
Start: 2022-01-12 | End: 2022-01-13

## 2022-01-12 RX ORDER — FLUMAZENIL 0.1 MG/ML
0.2 INJECTION INTRAVENOUS
Status: DISCONTINUED | OUTPATIENT
Start: 2022-01-12 | End: 2022-01-12 | Stop reason: HOSPADM

## 2022-01-12 RX ORDER — SODIUM CHLORIDE 0.9 % (FLUSH) 0.9 %
5-40 SYRINGE (ML) INJECTION EVERY 8 HOURS
Status: CANCELLED | OUTPATIENT
Start: 2022-01-12

## 2022-01-12 RX ORDER — DEXTROMETHORPHAN/PSEUDOEPHED 2.5-7.5/.8
1.2 DROPS ORAL
Status: CANCELLED | OUTPATIENT
Start: 2022-01-12

## 2022-01-12 RX ORDER — SODIUM CHLORIDE 9 MG/ML
1000 INJECTION, SOLUTION INTRAVENOUS CONTINUOUS
Status: CANCELLED | OUTPATIENT
Start: 2022-01-12 | End: 2022-01-12

## 2022-01-12 RX ORDER — AMLODIPINE AND BENAZEPRIL HYDROCHLORIDE 5; 10 MG/1; MG/1
1 CAPSULE ORAL DAILY
COMMUNITY

## 2022-01-12 RX ORDER — SODIUM CHLORIDE 9 MG/ML
125 INJECTION, SOLUTION INTRAVENOUS CONTINUOUS
Status: DISCONTINUED | OUTPATIENT
Start: 2022-01-12 | End: 2022-01-12 | Stop reason: HOSPADM

## 2022-01-12 RX ORDER — NALOXONE HYDROCHLORIDE 0.4 MG/ML
0.4 INJECTION, SOLUTION INTRAMUSCULAR; INTRAVENOUS; SUBCUTANEOUS
Status: DISCONTINUED | OUTPATIENT
Start: 2022-01-12 | End: 2022-01-12 | Stop reason: HOSPADM

## 2022-01-12 RX ORDER — SODIUM CHLORIDE 0.9 % (FLUSH) 0.9 %
5-40 SYRINGE (ML) INJECTION AS NEEDED
Status: CANCELLED | OUTPATIENT
Start: 2022-01-12

## 2022-01-12 RX ORDER — ATROPINE SULFATE 0.1 MG/ML
0.5 INJECTION INTRAVENOUS
Status: CANCELLED | OUTPATIENT
Start: 2022-01-12 | End: 2022-01-13

## 2022-01-12 RX ADMIN — SODIUM CHLORIDE 125 ML/HR: 9 INJECTION, SOLUTION INTRAVENOUS at 10:21

## 2022-01-12 NOTE — H&P
Assessment/Plan  # Detail Type Description    1. Assessment Abnormal results of liver function studies (R94.5). Patient Plan see below         2. Assessment Alcoholic fatty liver (W56.2). Patient Plan liver biopsy during surgery showed hepatic steatosis 35 %. he used to drink 12 to 20 drinks a day but quit completely > 2 weeks ago. His LFT are now normal         3. Assessment Carcinoma of colon (C18.2). Patient Plan Large cecal cancer had right colectomy on 2021 he is going to get chemotherapy. Colonoscopy on 2021 poor bowel prep tortuous colon, many polyps not removed because of the poor prep. so we need to repeat the colonoscopy within 6 months to allow for his sutures to heal well and finish with the chemotherapy. Presently he is doing well. he is going to be tested for the HNPCC mutation, if positive then he needs to have an EGD as well. all his first degree family  should have a screening colonoscopy starting at age 36. His uncle had colon cancer. told him he needs to see a dermatologist because of 2 verrucous lesions on the left helix and under the left eye lid                This 48year old male presents for Abnormal liver chemistry. History of Present Illness  1. Abnormal liver chemistry     Problem List  Problem Description Onset Date Chronic Clinical Status Notes   Tobacco use 2021 N     Colonic mass 2021 N     Right sided abdominal pain 2021 N     Alcohol use 2021 N     GERD (gastroesophageal reflux disease) 2021 N     Hypertension 2021 N     Carcinoma of colon 2021 N       Past Medical/Surgical History   (Detailed)        Family History   (Detailed)    Relationship Family Member Name  Age at Death Condition Onset Age Cause of Death   Mother    Lung disease  N     Social History  (Detailed)  Tobacco use reviewed. Preferred language is [place].       Marital Status/Family/Social Support  Marital status:      Tobacco use status: Current non-smoker. Smoking status: Never smoker. Tobacco Screening  Patient has never used tobacco. Patient has not used tobacco in the last 30 days. Patient has not used smokeless tobacco in the last 30 days. Smoking Status  Type Smoking Status Usage Per Day Years Used Pack Years Total Pack Years    Never smoker         Alcohol  There is no history of alcohol use. Caffeine  The patient does not use caffeine. Medications (active prior to today)  Medication Instructions Start Date Stop Date Refilled Elsewhere   famotidine 20 mg tablet Take 20 mg by mouth daily. // 04/29/2021  Y   enoxaparin 40 mg/0.4 mL subcutaneous syringe 40 mg, SubCUTAneous, DAILY, First dose on Mon 4/12/21 at 0900, Until Discontinued 04/12/2021   Y   morphine 4 mg/mL intravenous solution 4 mg, IntraVENous, EVERY 3 HOURS AS NEEDED, Starting Mon 4/12/21 at 0411, Until Discontinued, Severe Pain 04/12/2021   Y   ClearLax 17 gram/dose oral powder 17 g, Oral, DAILY PRN, Starting Mon 4/12/21 at [address], Until Discontinued, Constipation 04/12/2021 04/29/2021  Y   sodium chloride 0.9 % (flush) injection syringe 5-40 mL, IntraVENous, EVERY 8 HOURS, First dose on Mon 4/12/21 at 0600, Until Discontinued 04/12/2021 04/29/2021  Y   amlodipine 5 mg tablet Take 1 Tab by mouth daily. 04/18/2021   Y   Lotrel 10 mg-20 mg capsule take 1 capsule by oral route  every day //   Y   Protonix 40 mg tablet,delayed release take 1 tablet by oral route  every day //   Y   gabapentin 300 mg capsule take 1 capsule by oral route 3 times every day //   Y   melatonin 5 mg tablet  //   Y         Medications (Added, Continued or Stopped today)  Start Date Medication Directions PRN Status PRN Reason Instruction Stop Date   04/18/2021 amlodipine 5 mg tablet Take 1 Tab by mouth daily.  N      04/12/2021 ClearLax 17 gram/dose oral powder 17 g, Oral, DAILY PRN, Starting Mon 4/12/21 at [address], Until Discontinued, Constipation N   04/29/2021 04/12/2021 enoxaparin 40 mg/0.4 mL subcutaneous syringe 40 mg, SubCUTAneous, DAILY, First dose on Mon 4/12/21 at 0900, Until Discontinued N       famotidine 20 mg tablet Take 20 mg by mouth daily. N   04/29/2021    gabapentin 300 mg capsule take 1 capsule by oral route 3 times every day N       Lotrel 10 mg-20 mg capsule take 1 capsule by oral route  every day N       melatonin 5 mg tablet  N      04/12/2021 morphine 4 mg/mL intravenous solution 4 mg, IntraVENous, EVERY 3 HOURS AS NEEDED, Starting Mon 4/12/21 at 0411, Until Discontinued, Severe Pain N       Protonix 40 mg tablet,delayed release take 1 tablet by oral route  every day N      04/12/2021 sodium chloride 0.9 % (flush) injection syringe 5-40 mL, IntraVENous, EVERY 8 HOURS, First dose on Mon 4/12/21 at 0600, Until Discontinued N   04/29/2021     Allergies  Ingredient Reaction (Severity) Medication Name Comment   NO KNOWN ALLERGIES        Reviewed, no changes. Review of Systems  System Neg/Pos Details   Constitutional Negative Chills, Fever, Malaise and Weight loss. ENMT Negative Ear infections, Nasal congestion, Sinus Infection and Sore throat. Eyes Negative Double vision and Eye pain. Respiratory Negative Asthma, Chronic cough, Dyspnea, Pleuritic pain and Wheezing. Cardio Negative Chest pain, Edema and Irregular heartbeat/palpitations. GI Negative Abdominal pain, Change in bowel habits, Constipation, Decreased appetite, Diarrhea, Dysphagia, Heartburn, Hematemesis, Hematochezia, Melena, Nausea, Reflux and Vomiting.  Negative Dysuria, Hematuria, Urinary frequency, Urinary incontinence and Urinary retention. Endocrine Negative Cold intolerance, Gynecomastia, Heat intolerance and Increased thirst.   Neuro Negative Dizziness, Headache, Numbness, Tremors and Vertigo. Psych Negative Anxiety, Depression and Increased stress. Integumentary Negative Hives, Pruritus and Rash. MS Negative Back pain, Joint pain and Myalgia.    Hema/Lymph Negative Easy bleeding, Easy bruising and Lymphadenopathy. Allergic/Immuno Negative Chemicals in work place, Contact allergy, Food allergies, Immunosuppression and Seasonal allergies. Reproductive Negative Penile discharge and Sexual dysfunction.        Vital Signs   Height  Time ft in cm Last Measured Height Position   1:39 PM 5.0 10.00 177.80 04/29/2021      Weight/BSA/BMI  Time lb oz kg Context BMI kg/m2 BSA m2   1:39 .00  88.904 dressed with shoes 28.12      Date/Time Temp Pulse BP Cardiac Rhythm Who   01/12/22 1008 97.6 °F (36.4 °C) 75 118/74  SH       Respiratory Therapy (last day)    Date/Time Resp SpO2 O2 Device O2 Flow Rate (L/min) [facility]   01/12/22 1008 16 97 % None (Room air) [facility]         Active Patient Care Team Members  Name Contact Agency Type Support Role Relationship Active Date Inactive Date Specialty   Alejandro    Patient provider PCP      Areatha [place]   Emergency Contact Spouse        No change in H&P

## 2022-01-12 NOTE — PROCEDURES
[facility]  Colonoscopy Procedure Report  _______________________________________________________  Patient: Sirena [facility] II                                        Attending Physician: Taylor Carson MD    Patient ID: [id number]                                    Referring Physician: Krystal Padron NP    Exam Date: 1/12/2022      Introduction: A  46 y.o. male patient, presents for inpatient Colonoscopy    Indications: Large 6 cm cecal cancer had right colectomy on April 16, 2021 A:  liver wedge, biopsy:   steatosis (approximately 35%). No significant fibrosis (please see comment). B:  right colon, resection:     invasive colonic adenocarcinoma, moderately differentiated, invading   the muscularis propria (please see synoptic report). one of twenty six (1/26) lymph nodes positive for metastatic carcinoma. surgical resection margins negative for carcinoma. he is going to get chemotherapy. Colonoscopy on 4/12/ 2021 poor bowel prep tortuous colon, At least one 9 mm sessile polyp in the mid sigmoid colon at 22 cm not removed because of the poor bowel prep. 13 mm semipedunculated polyp in the hepatic flexure, not removed because it would be removed with the right hemicolectomy  Very large fungating and friable cancer at the ileo cecal valve and occupying most of the cecum. Biopsies taken. repeat the colonoscopy within 6 months to allow for his sutures to heal well and finish with the chemotherapy. Presently he is doing well. he is going to be tested for the HNPCC mutation, if positive then he needs to have an EGD as well. all his first degree family  should have a screening colonoscopy starting at age 36. His uncle had colon cancer. Consent: The benefits, risks, and alternatives to the procedure were discussed and informed consent was obtained from the patient. Preparation: EKG, pulse, pulse oximetry and blood pressure were monitored throughout the procedure.  ASA Classification: Class II- . The heart is an S1-S2 and regular heart rate and rhythm. Lungs are clear to auscultation and percussion. Abdomen is soft, nondistended, and nontender. Mental Status: awake, alert, and oriented to person, place, and time    Medications:  · Fentanyl 100 mcg IV before procedure. · Versed 5 mg IV throughout the procedure. Rectal Exam: Normal Rectal Exam. No Blood. Prostate not enlarged    Pathology Specimens:  3    Procedure: The colonoscope was passed with ease through the anus under direct visualization and advanced to the ileo-colonic anastomosis and 25 cm inside the terminal ileum. The scope was withdrawn and the mucosa was carefully examined. The quality of the preparation was good. The views were excellent. The patient's toleration of the procedure was excellent. The exam was done twice to the cecum. Total time is 24 minutes and withdrawal time is 17 minutes. Findings:    Rectum:   Small internal hemorrhoids. Sigmoid:   Tortuous sigmoid colon, 2 small 5 mm sessile sigmoid polyps in the sigmoid cold snared. 2 more 5 to 6 mm hyperplastic polyps in the distal sigmoid are cold snared but not recovered  Descending Colon:   6 mm sessile smooth polyp in the descending colon, hot snared. Transverse Colon:   2 small 4 to 5 mm sessile polyps in the transverse colon. Ascending Colon:   Sp right colectomy with end to side ileocolonic anastomosis. Cecum:   Removed   Terminal Ileum:   Normal over > 25 cm    Unplanned Events: There were no unplanned events. Estimated Blood Loss: None  IMPLANTS: * No implants in log *  Impressions: Small internal hemorrhoids. Tortuous sigmoid colon, 2 small 5 mm sessile sigmoid polyps in the sigmoid cold snared. 6 mm sessile smooth polyp in the descending colon, hot snared. 2 small 4 to 5 mm sessile polyps in the transverse colon. Sp right colectomy with end to side ileocolonic anastomosis. Normal Mucosa. No blood, diverticula or AVM found.     Complications: None; patient tolerated the procedure well. Recommendations:  · Discharge home when standard parameters are met. · Resume a high fiber diet. · Resume own medications. Avoid all NSAID's for 5 days  · Colonoscopy recommendation in 3 years.   · Take Miralax and/ or Colace 100 mg on regular basis if constipated    Procedure Codes:    Stone Kenny [[id number]]    Endoscope Information:  Model Number(s)    [id number]   Assistant: None  Signed By: Ambar Damico MD Date: 1/12/2022

## 2022-01-12 NOTE — DISCHARGE INSTRUCTIONS
Sirena [facility]  [id number]  1970    COLON DISCHARGE INSTRUCTIONS    Discomfort:  Redness at IV site- apply warm compress to area; if redness or soreness persist- contact your physician  There may be a slight amount of blood passed from the rectum  Gaseous discomfort- walking, belching will help relieve any discomfort  You may not operate a vehicle til the next day. You may not engage in an occupation involving machinery or appliances til the next day. You may not drink alcoholic beverages til the next day. DIET:   High fiber diet. ACTIVITY:  You may not  resume your normal daily activities til the next day. it is recommended that you spend the remainder of the day resting -  avoid any strenuous activity. CALL M.D.  IF ANY SIGN OF:   Increasing pain, nausea, vomiting  Abdominal distension (swelling)  New increased bleeding (oral or rectal)  Fever (chills)  Pain in chest area  Bloody discharge from nose or mouth  Shortness of breath    You may not  take any Advil, Aspirin, Ibuprofen, Motrin, Aleve, or Goodys for 5 days, ONLY  Tylenol as needed for pain. Post procedure diagnosis:  POLYPS; S/P RIGHT COLECTOMY; INTERNAL HEMORRHOIDS; TORTUOUS SIGMOID;     Follow-up Instructions: Your follow up colonoscopy will be in 3 years. We will notify you the results of your biopsy by letter within 2 weeks. Taylor Carson MD  January 12, 2022     DISCHARGE SUMMARY from Nurse    PATIENT INSTRUCTIONS:    After general anesthesia or intravenous sedation, for 24 hours or while taking prescription Narcotics:  · Limit your activities  · Do not drive and operate hazardous machinery  · Do not make important personal or business decisions  · Do  not drink alcoholic beverages  · If you have not urinated within 8 hours after discharge, please contact your surgeon on call.     Report the following to your surgeon:  · Excessive pain, swelling, redness or odor of or around the surgical area  · Temperature over 100.5  · Nausea and vomiting lasting longer than 4 hours or if unable to take medications  · Any signs of decreased circulation or nerve impairment to extremity: change in color, persistent  numbness, tingling, coldness or increase pain  · Any questions    What to do at Home:  Recommended activity: {discharge activity:43561}, as above    If you experience any of the following symptoms as above, please follow up with Dr. Andrei Bustamante. *  Please give a list of your current medications to your Primary Care Provider. *  Please update this list whenever your medications are discontinued, doses are      changed, or new medications (including over-the-counter products) are added. *  Please carry medication information at all times in case of emergency situations. These are general instructions for a healthy lifestyle:    No smoking/ No tobacco products/ Avoid exposure to second hand smoke  Surgeon General's Warning:  Quitting smoking now greatly reduces serious risk to your health. Obesity, smoking, and sedentary lifestyle greatly increases your risk for illness    A healthy diet, regular physical exercise & weight monitoring are important for maintaining a healthy lifestyle    You may be retaining fluid if you have a history of heart failure or if you experience any of the following symptoms:  Weight gain of 3 pounds or more overnight or 5 pounds in a week, increased swelling in our hands or feet or shortness of breath while lying flat in bed. Please call your doctor as soon as you notice any of these symptoms; do not wait until your next office visit. The discharge information has been reviewed with the patient and spouse. The patient and spouse verbalized understanding.   Discharge medications reviewed with the patient and spouse and appropriate educational materials and side effects teaching were provided.   ___________________________________________________________________________________________________________________________________

## 2022-03-02 ENCOUNTER — TRANSCRIBE ORDER (OUTPATIENT)
Dept: SCHEDULING | Age: 52
End: 2022-03-02

## 2022-03-02 DIAGNOSIS — C18.0 MALIGNANT NEOPLASM OF CECUM (HCC): Primary | ICD-10-CM

## 2022-03-02 DIAGNOSIS — Z79.899 ENCOUNTER FOR LONG-TERM (CURRENT) USE OF OTHER MEDICATIONS: Primary | ICD-10-CM

## 2022-03-29 ENCOUNTER — HOSPITAL ENCOUNTER (OUTPATIENT)
Dept: NON INVASIVE DIAGNOSTICS | Age: 52
Discharge: HOME | End: 2022-03-29
Attending: STUDENT IN AN ORGANIZED HEALTH CARE EDUCATION/TRAINING PROGRAM
Payer: OTHER GOVERNMENT

## 2022-03-29 ENCOUNTER — HOSPITAL ENCOUNTER (OUTPATIENT)
Dept: CT IMAGING | Age: 52
Discharge: HOME | End: 2022-03-29
Attending: STUDENT IN AN ORGANIZED HEALTH CARE EDUCATION/TRAINING PROGRAM
Payer: OTHER GOVERNMENT

## 2022-03-29 VITALS
WEIGHT: 201 LBS | HEIGHT: 69 IN | BODY MASS INDEX: 29.77 KG/M2 | DIASTOLIC BLOOD PRESSURE: 87 MMHG | SYSTOLIC BLOOD PRESSURE: 130 MMHG

## 2022-03-29 DIAGNOSIS — Z79.899 ENCOUNTER FOR LONG-TERM (CURRENT) USE OF OTHER MEDICATIONS: ICD-10-CM

## 2022-03-29 DIAGNOSIS — C18.0 MALIGNANT NEOPLASM OF CECUM (HCC): ICD-10-CM

## 2022-03-29 LAB
CREAT UR-MCNC: 0.8 MG/DL (ref 0.6–1.3)
ECHO AO ROOT DIAM: 3.4 CM
ECHO AO ROOT INDEX: 1.64 CM/M2
ECHO AV AREA PEAK VELOCITY: 2.6 CM2
ECHO AV AREA VTI: 2.4 CM2
ECHO AV AREA/BSA PEAK VELOCITY: 1.3 CM2/M2
ECHO AV AREA/BSA VTI: 1.2 CM2/M2
ECHO AV MEAN GRADIENT: 3 MMHG
ECHO AV MEAN VELOCITY: 0.7 M/S
ECHO AV PEAK GRADIENT: 5 MMHG
ECHO AV PEAK VELOCITY: 1.1 M/S
ECHO AV VELOCITY RATIO: 0.73
ECHO AV VTI: 23 CM
ECHO LA DIAMETER INDEX: 1.69 CM/M2
ECHO LA DIAMETER: 3.5 CM
ECHO LA TO AORTIC ROOT RATIO: 1.03
ECHO LA VOL 2C: 41 ML (ref 18–58)
ECHO LA VOL 4C: 39 ML (ref 18–58)
ECHO LA VOL BP: 41 ML (ref 18–58)
ECHO LA VOL/BSA BIPLANE: 20 ML/M2 (ref 16–34)
ECHO LA VOLUME AREA LENGTH: 44 ML
ECHO LA VOLUME INDEX A2C: 20 ML/M2 (ref 16–34)
ECHO LA VOLUME INDEX A4C: 19 ML/M2 (ref 16–34)
ECHO LA VOLUME INDEX AREA LENGTH: 21 ML/M2 (ref 16–34)
ECHO LV E' LATERAL VELOCITY: 13 CM/S
ECHO LV E' SEPTAL VELOCITY: 10 CM/S
ECHO LV EDV A2C: 62 ML
ECHO LV EDV A4C: 56 ML
ECHO LV EDV BP: 61 ML (ref 67–155)
ECHO LV EDV INDEX A4C: 27 ML/M2
ECHO LV EDV INDEX BP: 29 ML/M2
ECHO LV EDV NDEX A2C: 30 ML/M2
ECHO LV EJECTION FRACTION A2C: 65 %
ECHO LV EJECTION FRACTION A4C: 63 %
ECHO LV EJECTION FRACTION BIPLANE: 63 % (ref 55–100)
ECHO LV ESV A2C: 22 ML
ECHO LV ESV A4C: 21 ML
ECHO LV ESV BP: 22 ML (ref 22–58)
ECHO LV ESV INDEX A2C: 11 ML/M2
ECHO LV ESV INDEX A4C: 10 ML/M2
ECHO LV ESV INDEX BP: 11 ML/M2
ECHO LV FRACTIONAL SHORTENING: 33 % (ref 28–44)
ECHO LV INTERNAL DIMENSION DIASTOLE INDEX: 2.03 CM/M2
ECHO LV INTERNAL DIMENSION DIASTOLIC: 4.2 CM (ref 4.2–5.9)
ECHO LV INTERNAL DIMENSION SYSTOLIC INDEX: 1.35 CM/M2
ECHO LV INTERNAL DIMENSION SYSTOLIC: 2.8 CM
ECHO LV IVSD: 1.1 CM (ref 0.6–1)
ECHO LV MASS 2D: 147 G (ref 88–224)
ECHO LV MASS INDEX 2D: 71 G/M2 (ref 49–115)
ECHO LV POSTERIOR WALL DIASTOLIC: 1 CM (ref 0.6–1)
ECHO LV RELATIVE WALL THICKNESS RATIO: 0.48
ECHO LVOT AREA: 3.8 CM2
ECHO LVOT AV VTI INDEX: 0.66
ECHO LVOT DIAM: 2.2 CM
ECHO LVOT MEAN GRADIENT: 1 MMHG
ECHO LVOT PEAK GRADIENT: 3 MMHG
ECHO LVOT PEAK VELOCITY: 0.8 M/S
ECHO LVOT STROKE VOLUME INDEX: 27.9 ML/M2
ECHO LVOT SV: 57.8 ML
ECHO LVOT VTI: 15.2 CM
ECHO MV A VELOCITY: 0.79 M/S
ECHO MV E DECELERATION TIME (DT): 197.2 MS
ECHO MV E VELOCITY: 0.75 M/S
ECHO MV E/A RATIO: 0.95
ECHO MV E/E' LATERAL: 5.77
ECHO MV E/E' RATIO (AVERAGED): 6.63
ECHO MV E/E' SEPTAL: 7.5
ECHO RV INTERNAL DIMENSION: 3.3 CM
ECHO RV TAPSE: 1.7 CM (ref 1.5–2)

## 2022-03-29 PROCEDURE — 74177 CT ABD & PELVIS W/CONTRAST: CPT

## 2022-03-29 PROCEDURE — 74011000636 HC RX REV CODE- 636: Performed by: STUDENT IN AN ORGANIZED HEALTH CARE EDUCATION/TRAINING PROGRAM

## 2022-03-29 PROCEDURE — 82565 ASSAY OF CREATININE: CPT

## 2022-03-29 PROCEDURE — 93306 TTE W/DOPPLER COMPLETE: CPT

## 2022-03-29 RX ADMIN — IOPAMIDOL 100 ML: 612 INJECTION, SOLUTION INTRAVENOUS at 15:02

## 2022-10-29 ENCOUNTER — TRANSCRIBE ORDER (OUTPATIENT)
Dept: SCHEDULING | Age: 52
End: 2022-10-29

## 2022-10-29 DIAGNOSIS — C18.9 COLON CANCER (HCC): Primary | ICD-10-CM

## 2023-10-16 NOTE — PROGRESS NOTES
Reason for Admission:  Abdominal pain                     RUR Score:   Low; 6%                  Plan for utilizing home health: Unlikely          PCP: First and Last name:  None     Name of Practice:    Are you a current patient: Yes/No:    Approximate date of last visit:    Can you participate in a virtual visit with your PCP:                     Current Advanced Directive/Advance Care Plan: Full Code      Healthcare Decision Maker:   Click here to complete [address] including selection of the [address] Relationship (ie \"Primary\")             Primary Decision Maker: Dominique Lane - [phone]                  Transition of Care Plan:    Home with physician follow up                   Chart reviewed. Per H&P Coit Ser is a 48 y.o. male who presents to the ED with his wife after acute onset of abdominal pain this evening. He has not had similar episodes prior to this. He does have significant for cancer in his family history with an uncle who has had colon cancer. He has never had a colonoscopy. He endorses a change in bowel habits with softer stool starting about 6 months ago. No weight loss, change in appetite, fever, or cough. \"    CM spoke with pt to discuss transition of care. Pt is  and independent. Pt does not have a PCP and would like assistance with obtaining a PCP. CMS has been notified to assist.  Anticipate pt will transition home with physician follow up when medically stable. Please encourage ambulation as appropriate to assist with identifying potential transition of care needs. CM to continue to follow and assist.    Care Management Interventions  Mode of Transport at Discharge:  Other (see comment)(family)  Transition of Care Consult (CM Consult): Discharge Planning  Health Maintenance Reviewed: Yes  Current Support Network: Lives with Spouse  Confirm Follow Up Transport: Self  The Plan for Transition of Care is Related to the Following Treatment Goals : Home with physician follow up   Discharge Location  Discharge Placement: Home with family assistance chf
